# Patient Record
Sex: MALE | Race: WHITE
[De-identification: names, ages, dates, MRNs, and addresses within clinical notes are randomized per-mention and may not be internally consistent; named-entity substitution may affect disease eponyms.]

---

## 2019-02-19 ENCOUNTER — HOSPITAL ENCOUNTER (INPATIENT)
Dept: HOSPITAL 36 - ER | Age: 69
LOS: 3 days | Discharge: NURSING, REHAB OR LONG-TERM CARE | DRG: 885 | End: 2019-02-22
Attending: PSYCHIATRY & NEUROLOGY | Admitting: PSYCHIATRY & NEUROLOGY
Payer: MEDICARE

## 2019-02-19 DIAGNOSIS — J44.9: ICD-10-CM

## 2019-02-19 DIAGNOSIS — F31.2: Primary | ICD-10-CM

## 2019-02-19 DIAGNOSIS — N40.0: ICD-10-CM

## 2019-02-19 DIAGNOSIS — Z88.8: ICD-10-CM

## 2019-02-19 DIAGNOSIS — F17.210: ICD-10-CM

## 2019-02-19 DIAGNOSIS — N39.0: ICD-10-CM

## 2019-02-19 DIAGNOSIS — F29: ICD-10-CM

## 2019-02-19 DIAGNOSIS — K21.9: ICD-10-CM

## 2019-02-19 PROCEDURE — Z7610: HCPCS

## 2019-02-19 PROCEDURE — G0410 GRP PSYCH PARTIAL HOSP 45-50: HCPCS

## 2019-02-19 NOTE — ED PHYSICIAN CHART
ED Chief Complaint/HPI





- Patient Information


Date Seen:: 02/19/19


Time Seen:: 23:51


Chief Complaint:: Increased agitation


History of Present Illness:: 


68 yo male was [facility] from SN[facility] to ER for evaluation of increased agitation and 

aggressive behavior towards other resident today.


Allergies:: 


 Allergies











Allergy/AdvReac Type Severity Reaction Status Date / Time


 


clozapine [From Clozaril] Allergy   Verified 02/19/19 23:41














ED Review of Systems





- Review of Systems


General/Constitutional: No fever


Skin: No rash


Head: No headache


Eyes: No pain


ENT: No nasal drainage


Neck: No neck pain


Cardio Vascular: No chest pain


Pulmonary: No SOB


GI: No nausea, No vomiting


Musculoskeletal: Bone or joint pain


Psychiatric: No prior psych history


Neurological: No focal symptoms





ED Past Medical History





- Past Medical History


Past Medical History: Asthma/COPD, Other (Anemia, BPH)


Social History: Smoker, No Alcohol, No Drug Use


Psychiatricy History: Schizophrenia





Family Medical History





- Family Member


  ** Mother


History Unknown: Yes





ED Physical Exam





- Physical Examination


General/Constitutional: Awake, Alert


Head: Atraumatic


Eyes: PERRL


Skin: No skin lesions


ENMT: Nasal exam nl


Neck: No nuchal rigidity


Respiratory: No Wheeze/Rhonchi/Rales


Cardio Vascular: RRR, No murmur, gallop, rubs, NL S1 S2


GI: No tenderness/rebounding/guarding


Extremities: normal strength in all extremities


Neuro/Psych: No focal deficits





ED Labs/Radiology/EKG Results





- Lab Results


Results: 





 Laboratory Last Values











WBC  8.7 Th/cmm (4.8-10.8)   02/19/19  00:14    


 


RBC  4.25 Mil/cmm (3.80-5.80)   02/19/19  00:14    


 


Hgb  12.7 gm/dL (12-16)   02/19/19  00:14    


 


Hct  37.7 % (41.0-60)  L  02/19/19  00:14    


 


MCV  88.7 fl (80-99)   02/19/19  00:14    


 


MCH  30.0 pg (27.0-31.0)   02/19/19  00:14    


 


MCHC Differential  33.8 pg (28.0-36.0)   02/19/19  00:14    


 


RDW  13.1 % (11.5-20.0)   02/19/19  00:14    


 


Plt Count  361 Th/cmm (150-400)   02/19/19  00:14    


 


MPV  6.7 fl  02/19/19  00:14    


 


Neutrophils %  59.3 % (40.0-80.0)   02/19/19  00:14    


 


Lymphocytes %  27.7 % (20.0-50.0)   02/19/19  00:14    


 


Monocytes %  10.8 % (2.0-10.0)  H  02/19/19  00:14    


 


Eosinophils %  1.5 % (0.0-5.0)   02/19/19  00:14    


 


Basophils %  0.7 % (0.0-2.0)   02/19/19  00:14    


 


Sodium  137 mEq/L (136-145)   02/19/19  00:14    


 


Potassium  3.9 mEq/L (3.5-5.1)   02/19/19  00:14    


 


Chloride  101 mEq/L ()   02/19/19  00:14    


 


Carbon Dioxide  29.4 mEq/L (21.0-31.0)   02/19/19  00:14    


 


Anion Gap  10.5  (7.0-16.0)   02/19/19  00:14    


 


BUN  21 mg/dL (7-25)   02/19/19  00:14    


 


Creatinine  1.1 mg/dL (0.7-1.3)   02/19/19  00:14    


 


Est GFR ( Amer)  > 60.0 ml/min (>90)   02/19/19  00:14    


 


Est GFR (Non-Af Amer)  > 60.0 ml/min  02/19/19  00:14    


 


BUN/Creatinine Ratio  19.1   02/19/19  00:14    


 


Glucose  116 mg/dL ()  H  02/19/19  00:14    


 


Calcium  9.2 mg/dL (8.6-10.3)   02/19/19  00:14    


 


Total Bilirubin  0.4 mg/dL (0.3-1.0)   02/19/19  00:14    


 


AST  14 U/L (13-39)   02/19/19  00:14    


 


ALT  14 U/L (7-52)   02/19/19  00:14    


 


Alkaline Phosphatase  83 U/L ()   02/19/19  00:14    


 


Troponin I  < 0.01 ng/mL (0.01-0.05)  L  02/19/19  00:14    


 


B-Natriuretic Peptide  43.4 pg/mL (5.0-100.0)   02/19/19  00:14    


 


Total Protein  6.7 gm/dL (6.0-8.3)   02/19/19  00:14    


 


Albumin  4.2 gm/dL (4.2-5.5)   02/19/19  00:14    


 


Globulin  2.5 gm/dL  02/19/19  00:14    


 


Albumin/Globulin Ratio  1.7  (1.0-1.8)   02/19/19  00:14    


 


Urine Source  MIDSTREAM   02/19/19  23:59    


 


Urine Color  YELLOW   02/19/19  23:59    


 


Urine Clarity  HAZY  (CLEAR)   02/19/19  23:59    


 


Urine pH  6.0  (4.6 - 8.0)   02/19/19  23:59    


 


Ur Specific Gravity  1.020  (1.005-1.030)   02/19/19  23:59    


 


Urine Protein  NEGATIVE mg/dL (NEGATIVE)   02/19/19  23:59    


 


Urine Glucose (UA)  NEGATIVE mg/dL (NEGATIVE)   02/19/19  23:59    


 


Urine Ketones  NEGATIVE mg/dL (NEGATIVE)   02/19/19  23:59    


 


Urine Blood  MODERATE  (NEGATIVE)  H  02/19/19  23:59    


 


Urine Nitrate  NEGATIVE  (NEGATIVE)   02/19/19  23:59    


 


Urine Bilirubin  NEGATIVE  (NEGATIVE)   02/19/19  23:59    


 


Urine Urobilinogen  0.2 E.U./dL (0.2 - 1.0)   02/19/19  23:59    


 


Ur Leukocyte Esterase  MODERATE  (NEGATIVE)  H  02/19/19  23:59    


 


Urine RBC  2-5 /hpf (0-5)  H  02/19/19  23:59    


 


Urine WBC   /hpf (0-5)  H  02/19/19  23:59    


 


Ur Epithelial Cells  OCCASIONAL /lpf (FEW)   02/19/19  23:59    


 


Urine Bacteria  FEW /hpf (NONE SEEN)   02/19/19  23:59    














- Radiology Results


Results: 


CXR: no acute abnormality





ED Assessment





- Assessment


General Assessment: 


Urinary tract infection


BPH


Psychosis


Schizophrenia


Assessment/Comments:: 


CBC, CMP, Trop, BNP, UA


EKG, CXR


Rocephin 1g IM


Admit to [facility] for further evaluation and management





ED Septic Shock





- .


Is Septic Shock (SBP<90, OR Lactate>4 mmol\L) present?: No





ED Reassessment (Disposition)





- Reassessment


Reassessment Condition:: Improved





- Patient Disposition


Discharge/Transfer:: Nasrin mcginnis/in this hosp


Admitting Medical Physician:: Ashley Blanchard


Admitting Psych Physician:: Denisa Merino

## 2019-02-20 VITALS — DIASTOLIC BLOOD PRESSURE: 73 MMHG | SYSTOLIC BLOOD PRESSURE: 121 MMHG

## 2019-02-20 LAB
ALBUMIN SERPL-MCNC: 4.2 GM/DL (ref 4.2–5.5)
ALBUMIN/GLOB SERPL: 1.7 {RATIO} (ref 1–1.8)
ALP SERPL-CCNC: 83 U/L (ref 34–104)
ALT SERPL-CCNC: 14 U/L (ref 7–52)
ANION GAP SERPL CALC-SCNC: 10.5 MMOL/L (ref 7–16)
APPEARANCE UR: (no result)
AST SERPL-CCNC: 14 U/L (ref 13–39)
BACTERIA #/AREA URNS HPF: (no result) /HPF
BASOPHILS # BLD AUTO: 0.1 TH/CUMM (ref 0–0.2)
BASOPHILS NFR BLD AUTO: 0.7 % (ref 0–2)
BILIRUB SERPL-MCNC: 0.4 MG/DL (ref 0.3–1)
BILIRUB UR-MCNC: NEGATIVE MG/DL
BUN SERPL-MCNC: 21 MG/DL (ref 7–25)
CALCIUM SERPL-MCNC: 9.2 MG/DL (ref 8.6–10.3)
CHLORIDE SERPL-SCNC: 101 MEQ/L (ref 98–107)
CHOLEST SERPL-MCNC: 147 MG/DL (ref ?–200)
CO2 SERPL-SCNC: 29.4 MEQ/L (ref 21–31)
COLOR UR: YELLOW
CREAT SERPL-MCNC: 1.1 MG/DL (ref 0.7–1.3)
EOSINOPHIL # BLD AUTO: 0.1 TH/CMM (ref 0.1–0.4)
EOSINOPHIL NFR BLD AUTO: 1.5 % (ref 0–5)
EPI CELLS URNS QL MICRO: (no result) /LPF
ERYTHROCYTE [DISTWIDTH] IN BLOOD BY AUTOMATED COUNT: 13.1 % (ref 11.5–20)
GLOBULIN SER-MCNC: 2.5 GM/DL
GLUCOSE SERPL-MCNC: 116 MG/DL (ref 70–105)
GLUCOSE UR STRIP-MCNC: NEGATIVE MG/DL
HCT VFR BLD CALC: 37.7 % (ref 41–60)
HDLC SERPL-MCNC: 48 MG/DL (ref 23–92)
HGB BLD-MCNC: 12.7 GM/DL (ref 12–16)
KETONES UR STRIP-MCNC: NEGATIVE MG/DL
LEUKOCYTE ESTERASE UR-ACNC: (no result)
LYMPHOCYTE AB SER FC-ACNC: 2.4 TH/CMM (ref 1.5–3)
LYMPHOCYTES NFR BLD AUTO: 27.7 % (ref 20–50)
MCH RBC QN AUTO: 30 PG (ref 27–31)
MCHC RBC AUTO-ENTMCNC: 33.8 PG (ref 28–36)
MCV RBC AUTO: 88.7 FL (ref 80–99)
MICRO URNS: YES
MONOCYTES # BLD AUTO: 0.9 TH/CMM (ref 0.3–1)
MONOCYTES NFR BLD AUTO: 10.8 % (ref 2–10)
NEUTROPHILS # BLD: 5.2 TH/CMM (ref 1.8–8)
NEUTROPHILS NFR BLD AUTO: 59.3 % (ref 40–80)
NITRITE UR QL STRIP: NEGATIVE
PH UR STRIP: 6 [PH] (ref 4.6–8)
PLATELET # BLD: 361 TH/CMM (ref 150–400)
PMV BLD AUTO: 6.7 FL
POTASSIUM SERPL-SCNC: 3.9 MEQ/L (ref 3.5–5.1)
PROT UR STRIP-MCNC: NEGATIVE MG/DL
RBC # BLD AUTO: 4.25 MIL/CMM (ref 3.8–5.8)
RBC # UR STRIP: (no result) /UL
RBC #/AREA URNS HPF: (no result) /HPF (ref 0–5)
SODIUM SERPL-SCNC: 137 MEQ/L (ref 136–145)
SP GR UR STRIP: 1.02 (ref 1–1.03)
TRIGL SERPL-MCNC: 107 MG/DL (ref ?–150)
URINALYSIS COMPLETE PNL UR: (no result)
UROBILINOGEN UR STRIP-ACNC: 0.2 E.U./DL (ref 0.2–1)
WBC # BLD AUTO: 8.7 TH/CMM (ref 4.8–10.8)
WBC #/AREA URNS HPF: (no result) /HPF (ref 0–5)

## 2019-02-20 RX ADMIN — IPRATROPIUM BROMIDE SCH MG: 0.5 SOLUTION RESPIRATORY (INHALATION) at 23:42

## 2019-02-20 RX ADMIN — PANTOPRAZOLE SODIUM SCH MG: 40 TABLET, DELAYED RELEASE ORAL at 07:11

## 2019-02-20 RX ADMIN — IPRATROPIUM BROMIDE SCH: 0.5 SOLUTION RESPIRATORY (INHALATION) at 19:00

## 2019-02-20 NOTE — DIAGNOSTIC IMAGING REPORT
Portable chest x-ray



History: Shortness of breath



Allowing for portable technique the heart size is normal. 

Atherosclerotic calcification seen within the aortic arch.  No focal

pulmonary parenchymal processes. No hilar or mediastinal abnormalities.



Impression:

1.  No acute abnormalities

2.  Atherosclerotic vascular changes

## 2019-02-20 NOTE — HISTORY & PHYSICAL
ADMIT DATE:  



HISTORY OF PRESENT ILLNESS:  The patient is a 69-year-old male with long history

of asthma, benign prostatic hypertrophy, gastroesophageal reflux, psychosis,

admitted to [facility] under Dr. Merino's service.  The patient

denies any chest pain, shortness of breath, nausea, vomiting, fever, or chills.



PAST MEDICAL HISTORY:  Significant for benign prostatic hypertrophy, asthma,

gastroesophageal reflux, psychosis.



PAST SURGICAL HISTORY:  No recent surgery.



ALLERGIES:  CLOZAPINE.



SOCIAL HISTORY:  No smoking, no alcohol, no drug.



FAMILY HISTORY:  Noncontributory.



MEDICATIONS:  Follow admission reconciliation.



REVIEW OF SYSTEMS:

IMMUNO SYSTEM:  No history of chronic immuno disorder.

CARDIOVASCULAR SYSTEM:  No coronary artery disease.

ENDOCRINE SYSTEM:  No diabetes or thyroid problem.

GASTROINTESTINAL SYSTEM:  No upper or lower gastrointestinal bleed.

NEUROLOGICAL SYSTEM:  Seizure disorder.

MUSCULOSKELETAL SYSTEM:  No muscular dystrophy.

HEMATOLOGIC SYSTEM:  No bleeding tendencies.

RESPIRATORY:  The patient has asthma.

GENITOURINARY:  No dysuria or hematuria.  The patient has benign prostatic

hypertrophy.



PHYSICAL EXAMINATION:

GENERAL:  He is awake, alert.

VITAL SIGNS:  Temperature 97.9, heart rate 82, blood pressure 153/83.

HEENT:  Normocephalic.  Pupils are reactive to light and accommodation.  Sclerae

clear.

NECK:  Supple.  Negative for lymphadenopathy, JVD, or bruit.

CHEST:  Bilaterally normal.  No rhonchi or wheezing.

HEART:  S1, S2 normal.  No gallop rhythm.

ABDOMEN:  Soft, bowel sounds positive.

EXTREMITIES:  No edema.

NEUROLOGIC:  Awake, alert, mildly confused.  No focal motor or sensory deficit. 

Cranial nerves 2-12 are intact.



ASSESSMENT:

1.  Asthma.

2.  Chronic obstructive pulmonary disease.

3.  Gastroesophageal reflux.

4.  Psychosis.



PLAN:  The patient admitted to the hospital under Dr. Merino's service.  Medical

problem  addressed during hospitalization is psychosis.  Problem addressed at

discharge are COPD and benign prostatic hypertrophy.  The patient is medically

stable for activity.



Thank you, Dr. Merino, for asking me to see your patient.





DD: 02/20/2019 19:25

DT: 02/20/2019 19:43

JOB# [id number]  [id number]

## 2019-02-21 RX ADMIN — IPRATROPIUM BROMIDE SCH MG: 0.5 SOLUTION RESPIRATORY (INHALATION) at 19:22

## 2019-02-21 RX ADMIN — IPRATROPIUM BROMIDE SCH MG: 0.5 SOLUTION RESPIRATORY (INHALATION) at 06:57

## 2019-02-21 RX ADMIN — PANTOPRAZOLE SODIUM SCH MG: 40 TABLET, DELAYED RELEASE ORAL at 06:43

## 2019-02-21 RX ADMIN — IPRATROPIUM BROMIDE SCH: 0.5 SOLUTION RESPIRATORY (INHALATION) at 00:00

## 2019-02-21 RX ADMIN — IPRATROPIUM BROMIDE SCH MG: 0.5 SOLUTION RESPIRATORY (INHALATION) at 14:01

## 2019-02-21 NOTE — INTERNAL MEDICINE PROG NOTE
Internal Medicine Subjective





- Subjective


Service Date: 02/21/19


Patient seen and examined:: without staff


Patient is:: awake, verbal, ambulating, talking


Per staff patient has:: no adverse event





Internal Medicine Objective





- Results


Result Diagrams: 


 02/19/19 00:14





 02/19/19 00:14


Recent Labs: 


 Laboratory Last Values











WBC  8.7 Th/cmm (4.8-10.8)   02/19/19  00:14    


 


RBC  4.25 Mil/cmm (3.80-5.80)   02/19/19  00:14    


 


Hgb  12.7 gm/dL (12-16)   02/19/19  00:14    


 


Hct  37.7 % (41.0-60)  L  02/19/19  00:14    


 


MCV  88.7 fl (80-99)   02/19/19  00:14    


 


MCH  30.0 pg (27.0-31.0)   02/19/19  00:14    


 


MCHC Differential  33.8 pg (28.0-36.0)   02/19/19  00:14    


 


RDW  13.1 % (11.5-20.0)   02/19/19  00:14    


 


Plt Count  361 Th/cmm (150-400)   02/19/19  00:14    


 


MPV  6.7 fl  02/19/19  00:14    


 


Neutrophils %  59.3 % (40.0-80.0)   02/19/19  00:14    


 


Lymphocytes %  27.7 % (20.0-50.0)   02/19/19  00:14    


 


Monocytes %  10.8 % (2.0-10.0)  H  02/19/19  00:14    


 


Eosinophils %  1.5 % (0.0-5.0)   02/19/19  00:14    


 


Basophils %  0.7 % (0.0-2.0)   02/19/19  00:14    


 


Sodium  137 mEq/L (136-145)   02/19/19  00:14    


 


Potassium  3.9 mEq/L (3.5-5.1)   02/19/19  00:14    


 


Chloride  101 mEq/L ()   02/19/19  00:14    


 


Carbon Dioxide  29.4 mEq/L (21.0-31.0)   02/19/19  00:14    


 


Anion Gap  10.5  (7.0-16.0)   02/19/19  00:14    


 


BUN  21 mg/dL (7-25)   02/19/19  00:14    


 


Creatinine  1.1 mg/dL (0.7-1.3)   02/19/19  00:14    


 


Est GFR ( Amer)  > 60.0 ml/min (>90)   02/19/19  00:14    


 


Est GFR (Non-Af Amer)  > 60.0 ml/min  02/19/19  00:14    


 


BUN/Creatinine Ratio  19.1   02/19/19  00:14    


 


Glucose  116 mg/dL ()  H  02/19/19  00:14    


 


Calcium  9.2 mg/dL (8.6-10.3)   02/19/19  00:14    


 


Total Bilirubin  0.4 mg/dL (0.3-1.0)   02/19/19  00:14    


 


AST  14 U/L (13-39)   02/19/19  00:14    


 


ALT  14 U/L (7-52)   02/19/19  00:14    


 


Alkaline Phosphatase  83 U/L ()   02/19/19  00:14    


 


Troponin I  < 0.01 ng/mL (0.01-0.05)  L  02/19/19  00:14    


 


B-Natriuretic Peptide  43.4 pg/mL (5.0-100.0)   02/19/19  00:14    


 


Total Protein  6.7 gm/dL (6.0-8.3)   02/19/19  00:14    


 


Albumin  4.2 gm/dL (4.2-5.5)   02/19/19  00:14    


 


Globulin  2.5 gm/dL  02/19/19  00:14    


 


Albumin/Globulin Ratio  1.7  (1.0-1.8)   02/19/19  00:14    


 


Triglycerides  107 mg/dL (<150)   02/20/19  00:14    


 


Cholesterol  147 mg/dL (<200)   02/20/19  00:14    


 


LDL Cholesterol Direct  110 mg/dL ()   02/20/19  00:14    


 


HDL Cholesterol  48 mg/dL (23-92)   02/20/19  00:14    


 


TSH  1.88 uIU/ml (0.34-5.60)   02/19/19  00:14    


 


Urine Source  MIDSTREAM   02/19/19  23:59    


 


Urine Color  YELLOW   02/19/19  23:59    


 


Urine Clarity  HAZY  (CLEAR)   02/19/19  23:59    


 


Urine pH  6.0  (4.6 - 8.0)   02/19/19  23:59    


 


Ur Specific Gravity  1.020  (1.005-1.030)   02/19/19  23:59    


 


Urine Protein  NEGATIVE mg/dL (NEGATIVE)   02/19/19  23:59    


 


Urine Glucose (UA)  NEGATIVE mg/dL (NEGATIVE)   02/19/19  23:59    


 


Urine Ketones  NEGATIVE mg/dL (NEGATIVE)   02/19/19  23:59    


 


Urine Blood  MODERATE  (NEGATIVE)  H  02/19/19  23:59    


 


Urine Nitrate  NEGATIVE  (NEGATIVE)   02/19/19  23:59    


 


Urine Bilirubin  NEGATIVE  (NEGATIVE)   02/19/19  23:59    


 


Urine Urobilinogen  0.2 E.U./dL (0.2 - 1.0)   02/19/19  23:59    


 


Ur Leukocyte Esterase  MODERATE  (NEGATIVE)  H  02/19/19  23:59    


 


Urine RBC  2-5 /hpf (0-5)  H  02/19/19  23:59    


 


Urine WBC   /hpf (0-5)  H  02/19/19  23:59    


 


Ur Epithelial Cells  OCCASIONAL /lpf (FEW)   02/19/19  23:59    


 


Urine Bacteria  FEW /hpf (NONE SEEN)   02/19/19  23:59    














- Physical Exam


Vitals and I&O: 


 Vital Signs











Temp  97.4 F   02/21/19 16:16


 


Pulse  75   02/21/19 16:16


 


Resp  20   02/21/19 16:16


 


BP  115/67   02/21/19 16:16


 


Pulse Ox  100   02/21/19 16:16








 Intake & Output











 02/20/19 02/21/19 02/21/19





 18:59 06:59 18:59


 


Intake Total 1500 120 


 


Balance 1500 120 


 


Intake:   


 


  Oral 1500 120 


 


Other:   


 


  # Voids 3 3 


 


  # Bowel Movements 0 0 











Active Medications: 


Current Medications





Acetaminophen (Tylenol)  650 mg PO Q4H PRN


   PRN Reason: Pain or Fever >101


   Stop: 04/21/19 04:59


Al Hydrox/Mg Hydrox/Simethicone (Maalox)  30 ml PO Q4H PRN


   PRN Reason: GI DISTRESS


   Stop: 04/21/19 03:42


   Last Admin: 02/20/19 18:03 Dose:  30 ml


Benztropine Mesylate (Cogentin)  2 mg PO BID [facility]


   Stop: 04/21/19 08:59


   Last Admin: 02/20/19 17:13 Dose:  2 mg


Docusate Sodium (Colace)  250 mg PO BID [facility]UK[facility]


   Stop: 04/21/19 08:59


   Last Admin: 02/20/19 17:13 Dose:  250 mg


Folic Acid (Folate)  1 mg PO DAILY [facility]UK[facility]


   Stop: 04/21/19 08:59


   Last Admin: 02/20/19 08:43 Dose:  1 mg


Ipratropium Bromide (Atrovent Neb 0.5mg/2.5ml)  0.5 mg HHN Q6HRT MUKESH


   Stop: 04/21/19 12:59


   Last Admin: 02/21/19 14:01 Dose:  0.5 mg


Lorazepam (Ativan)  0.5 mg PO Q4H PRN; Protocol


   PRN Reason: Agitation


   Stop: 04/21/19 03:42


Magnesium Hydroxide (Milk Of Magnesia)  30 ml PO HS PRN


   PRN Reason: Constipation


Miscellaneous (Folic Acid [Folic Acid])  400 mcg PO DAILY MUKESH


   Stop: 04/23/19 08:59


Multivitamins/Vitamin C (Theragran)  1 tab PO DAILY [facility]


   Stop: 04/21/19 08:59


   Last Admin: 02/20/19 08:43 Dose:  1 tab


Olanzapine (Zyprexa)  10 mg PO HS MUKESH; Protocol


   Stop: 04/21/19 20:59


   Last Admin: 02/20/19 20:38 Dose:  10 mg


Pantoprazole Sodium (Protonix)  40 mg PO QDAC MUKESH


   Stop: 04/21/19 07:29


   Last Admin: 02/21/19 06:43 Dose:  40 mg


Tamsulosin HCl (Flomax)  0.4 mg PO DAILY MUKESH


   Stop: 04/21/19 08:59


   Last Admin: 02/20/19 08:43 Dose:  0.4 mg


Zolpidem Tartrate (Ambien)  5 mg PO HS PRN


   PRN Reason: Insomnia


   Last Admin: 02/20/19 20:39 Dose:  5 mg








General: alert


HEENT: NC/AT, PERRLA, EOMI, anicteric sclerae, throat clear


Neck: Supple, No JVD, No thyromegaly, +2 carotid pulse wo bruit, No LAD


Lungs: CTAB


Cardiovascular: RRR, Normal S1, Normal S2


Abdomen: soft, non-tender, non-distended


Extremities: clear


Neurological: no change, alert, gait stable





Internal Medicine Assmt/Plan





- Assessment


Assessment: 





1.UTI


2.BPH.


3.PILLO.


4.PSYCHOSIS.





- Plan


Plan: 





CIPRO 500 MG PO BID FOR 5 DAYS.

## 2019-02-21 NOTE — PSYCHIATRIC EVALUATION
DATE OF SERVICE:  



PSYCHIATRIC INITIAL EVALUATION AND MENTAL STATUS EXAM



PATIENT'S AGE:  69.



SEX:  Male.



PHYSICIAN:  Dr. Merino.



CHIEF COMPLAINT:  Agitation and irritability.



HISTORY OF PRESENT ILLNESS:  The patient was transferred from [facility] to [facility] because of increased irritability,

agitation, and aggressive behavior.  The patient also has been intrusive to

others and has been in angry mood.  The patient also has been having difficulty

following directions.  The patient has been taking Zyprexa in a dose of 10 mg at

bedtime with no improvement.



PAST PSYCHIATRIC HISTORY:  The patient has history of what seems to be bipolar

disorder.



PAST MEDICAL HISTORY:  The patient has hypertension.



SOCIAL HISTORY:  The patient lives in [facility].  No

known alcohol or drug use.



ALLERGIES:  No known allergies.



MENTAL STATUS EXAMINATION:  The patient appears slightly older than stated age. 

Irritable mood.  Anxious.  Disheveled.  Thought processes are circumstantial and

tangential.  The patient denies hallucinations, but seems to be preoccupied and

paranoid.  He denies any thoughts of suicide or homicide.  The patient is alert

and oriented to time, place, person, and situation.  Intact immediate, recent

and remote memories.  Poor insight and poor judgment.



ASSESSMENT:

PRIMARY DIAGNOSIS:  Bipolar disorder, manic episode, with psychotic features.



TREATMENT PLAN:  We will monitor the patient's behavior and condition closely. 

Also, we will adjust psychotropic medications.  Also working on his behaviour

and behavior modification.



ESTIMATED LENGTH OF STAY:  5-7 days.



THE PATIENT'S STRENGTHS AND WEAKNESSES:  The patient's strength is not clear at

this time.  Weaknesses is his poor judgment.



AFTER DISCHARGE PLAN FOR DISCHARGE:  Better impulse control and stabilizing

psychotropic medications.





DD: 02/20/2019 20:05

DT: 02/21/2019 01:22

[facility]# [id number]  [id number]

## 2019-02-22 RX ADMIN — IPRATROPIUM BROMIDE SCH: 0.5 SOLUTION RESPIRATORY (INHALATION) at 00:00

## 2019-02-22 RX ADMIN — IPRATROPIUM BROMIDE SCH MG: 0.5 SOLUTION RESPIRATORY (INHALATION) at 07:01

## 2019-02-22 RX ADMIN — PANTOPRAZOLE SODIUM SCH MG: 40 TABLET, DELAYED RELEASE ORAL at 06:35

## 2019-02-22 RX ADMIN — IPRATROPIUM BROMIDE SCH MG: 0.5 SOLUTION RESPIRATORY (INHALATION) at 13:12

## 2019-02-25 NOTE — DISCHARGE SUMMARY
DATE OF DISCHARGE:  02/22/2019



FINAL DIAGNOSES/PRIMARY DIAGNOSES:  Depressive mood disorder, severe, with

psychotic features.



SECONDARY DIAGNOSES:  Rule out dementia with psychosis, moderate to severe.



REASON FOR HOSPITALIZATION:  The patient was admitted to the hospital because of

increased agitation and irritability as well as depression.



HOSPITAL COURSE:  The patient continued to be easily agitated.  The patient also

needed a lot of redirections.  Also, at times seems to be depressed and he was

interacting minimally with others.  He also was having difficulty with

redirections and his judgment was poor.  The patient was given Zyprexa 10 mg

every day as well as Ativan on a p.r.n. basis.  Gradually, the patient's affect

was brighter and he was less agitated and less irritable and he was discharged

from the hospital.



PHYSICAL EXAMINATION:  The patient had bronchial asthma as well as benign

prostatic hypertrophy.  The patient had no major medical problems while in the

hospital.



AFTER DISCHARGE PLANS:  The patient discharged from the hospital back to [place] with plan to follow him up there.



EXPECTED OUTCOME AFTER DISCHARGE:  Fair if the patient continue to take his

psychotropic medications and follow up with discharge plans.





DD: 02/25/2019 07:36

DT: 02/25/2019 10:07

[facility]# [id number]  [id number]

## 2019-07-13 ENCOUNTER — HOSPITAL ENCOUNTER (INPATIENT)
Dept: HOSPITAL 36 - ER | Age: 69
LOS: 16 days | Discharge: NURSING, REHAB OR LONG-TERM CARE | DRG: 885 | End: 2019-07-29
Attending: PSYCHIATRY & NEUROLOGY | Admitting: PSYCHIATRY & NEUROLOGY
Payer: MEDICARE

## 2019-07-13 VITALS — SYSTOLIC BLOOD PRESSURE: 115 MMHG | DIASTOLIC BLOOD PRESSURE: 62 MMHG

## 2019-07-13 DIAGNOSIS — Z79.899: ICD-10-CM

## 2019-07-13 DIAGNOSIS — Z87.11: ICD-10-CM

## 2019-07-13 DIAGNOSIS — F25.0: Primary | ICD-10-CM

## 2019-07-13 DIAGNOSIS — J44.9: ICD-10-CM

## 2019-07-13 DIAGNOSIS — M19.90: ICD-10-CM

## 2019-07-13 DIAGNOSIS — Z88.8: ICD-10-CM

## 2019-07-13 DIAGNOSIS — G40.909: ICD-10-CM

## 2019-07-13 DIAGNOSIS — K21.9: ICD-10-CM

## 2019-07-13 DIAGNOSIS — N40.0: ICD-10-CM

## 2019-07-13 DIAGNOSIS — F03.91: ICD-10-CM

## 2019-07-13 DIAGNOSIS — K59.09: ICD-10-CM

## 2019-07-13 LAB
ALBUMIN SERPL-MCNC: 3.8 GM/DL (ref 4.2–5.5)
ALBUMIN/GLOB SERPL: 1.5 {RATIO} (ref 1–1.8)
ALP SERPL-CCNC: 90 U/L (ref 34–104)
ALT SERPL-CCNC: 20 U/L (ref 7–52)
ANION GAP SERPL CALC-SCNC: 11.3 MMOL/L (ref 7–16)
APPEARANCE UR: CLEAR
AST SERPL-CCNC: 15 U/L (ref 13–39)
BACTERIA #/AREA URNS HPF: (no result) /HPF
BASOPHILS # BLD AUTO: 0.1 TH/CUMM (ref 0–0.2)
BASOPHILS NFR BLD AUTO: 0.8 % (ref 0–2)
BILIRUB SERPL-MCNC: 0.4 MG/DL (ref 0.3–1)
BILIRUB UR-MCNC: NEGATIVE MG/DL
BUN SERPL-MCNC: 25 MG/DL (ref 7–25)
CALCIUM SERPL-MCNC: 9.3 MG/DL (ref 8.6–10.3)
CHLORIDE SERPL-SCNC: 101 MEQ/L (ref 98–107)
CHOLEST SERPL-MCNC: 156 MG/DL (ref ?–200)
CO2 SERPL-SCNC: 25.8 MEQ/L (ref 21–31)
COLOR UR: YELLOW
CREAT SERPL-MCNC: 1 MG/DL (ref 0.7–1.3)
EOSINOPHIL # BLD AUTO: 0.3 TH/CMM (ref 0.1–0.4)
EOSINOPHIL NFR BLD AUTO: 4 % (ref 0–5)
EPI CELLS URNS QL MICRO: (no result) /LPF
ERYTHROCYTE [DISTWIDTH] IN BLOOD BY AUTOMATED COUNT: 14.5 % (ref 11.5–20)
GLOBULIN SER-MCNC: 2.6 GM/DL
GLUCOSE SERPL-MCNC: 99 MG/DL (ref 70–105)
GLUCOSE UR STRIP-MCNC: NEGATIVE MG/DL
HCT VFR BLD CALC: 36.3 % (ref 41–60)
HDLC SERPL-MCNC: 48 MG/DL (ref 23–92)
HGB BLD-MCNC: 12.2 GM/DL (ref 12–16)
KETONES UR STRIP-MCNC: NEGATIVE MG/DL
LEUKOCYTE ESTERASE UR-ACNC: NEGATIVE
LYMPHOCYTE AB SER FC-ACNC: 2.8 TH/CMM (ref 1.5–3)
LYMPHOCYTES NFR BLD AUTO: 38.3 % (ref 20–50)
MCH RBC QN AUTO: 29.3 PG (ref 27–31)
MCHC RBC AUTO-ENTMCNC: 33.7 PG (ref 28–36)
MCV RBC AUTO: 87 FL (ref 80–99)
MICRO URNS: YES
MONOCYTES # BLD AUTO: 0.8 TH/CMM (ref 0.3–1)
MONOCYTES NFR BLD AUTO: 11.1 % (ref 2–10)
NEUTROPHILS # BLD: 3.4 TH/CMM (ref 1.8–8)
NEUTROPHILS NFR BLD AUTO: 45.8 % (ref 40–80)
NITRITE UR QL STRIP: NEGATIVE
PH UR STRIP: 5.5 [PH] (ref 4.6–8)
PLATELET # BLD: 284 TH/CMM (ref 150–400)
POTASSIUM SERPL-SCNC: 4.1 MEQ/L (ref 3.5–5.1)
PROT UR STRIP-MCNC: NEGATIVE MG/DL
RBC # BLD AUTO: 4.18 MIL/CMM (ref 3.8–5.8)
RBC # UR STRIP: (no result) /UL
RBC #/AREA URNS HPF: (no result) /HPF (ref 0–5)
SODIUM SERPL-SCNC: 134 MEQ/L (ref 136–145)
SP GR UR STRIP: 1.02 (ref 1–1.03)
TRIGL SERPL-MCNC: 73 MG/DL (ref ?–150)
URINALYSIS COMPLETE PNL UR: (no result)
UROBILINOGEN UR STRIP-ACNC: 0.2 E.U./DL (ref 0.2–1)
WBC # BLD AUTO: 7.4 TH/CMM (ref 4.8–10.8)
WBC #/AREA URNS HPF: (no result) /HPF (ref 0–5)

## 2019-07-13 PROCEDURE — G0410 GRP PSYCH PARTIAL HOSP 45-50: HCPCS

## 2019-07-13 PROCEDURE — Z7610: HCPCS

## 2019-07-13 RX ADMIN — PANTOPRAZOLE SODIUM SCH MG: 40 TABLET, DELAYED RELEASE ORAL at 09:40

## 2019-07-13 NOTE — HISTORY & PHYSICAL
ADMIT DATE:  



HISTORY OF PRESENT ILLNESS:  The patient is a 69-year-old male with long history

of dementia, psychosis, benign prostatic hypertrophy, degenerative joint

disease, admitted to [facility] under Dr. Merino's service for

evaluation and treatment.  Apparently, the patient has been very confused and

psychotic.  No fever, no chills, no nausea, no vomiting, no chest pain.



PAST MEDICAL HISTORY:  Significant for benign prostatic hypertrophy, chronic

constipation, degenerative joint disease, psychosis, dementia.



PAST SURGICAL HISTORY:  No recent surgery.



ALLERGIES:  CLONAZEPAM.



SOCIAL HISTORY:  No smoking, no alcohol, no drug.



FAMILY HISTORY:  Noncontributory.



MEDICATIONS:  Follow admission reconciliation.



REVIEW OF SYSTEMS:

RENAL SYSTEM:  No history of chronic renal disorder.

CARDIOVASCULAR SYSTEM:  No coronary artery disease.

ENDOCRINE SYSTEM:  No diabetes or thyroid problem.

GASTROINTESTINAL SYSTEM:  No upper or lower GI bleeding.

NEUROLOGICAL SYSTEM:  No seizure disorder.

MUSCULOSKELETAL SYSTEM:  No muscular dystrophy.  Has degenerative joint disease.

HEMATOLOGIC SYSTEM:  No bleeding tendencies.

RESPIRATORY SYSTEM:  No asthma.

GENITOURINARY:  Has benign prostatic hypertrophy.



PHYSICAL EXAMINATION:

GENERAL:  He is awake, not coherent.

VITAL SIGNS:  Temperature 97.7, heart rate 70, blood pressure 118/69.

HEENT:  Normocephalic.  Pupils reacting equal to light and accommodation. 

Sclerae clear.

NECK:  Supple.  Negative for lymphadenopathy, JVD or bruit.

CHEST:  Bilaterally normal.  No rhonchi or wheezing.

HEART:  S1, S2 normal.  No gallop rhythm.

ABDOMEN:  Soft, bowel sounds positive.

EXTREMITIES:  No edema.

NEUROLOGIC:  He is awake, alert, not fully coherent.  No focal muscle deficits. 

Cranial nerves 2-12 is intact.



LABORATORY DATA:  White blood cell 7.4, hemoglobin 12.2, hematocrit 36.3,

platelet is 284.  Sodium 134, potassium 4.1, BUN 25, creatinine 1.0.



ASSESSMENT:

1.  Benign prostatic hypertrophy.

2.  Degenerative joint disease.

3.  Chronic constipation.

4.  Psychosis.



PLAN:  The patient admitted to hospital under Dr. Merino's service.  Medical

problem addressed during hospitalization is psychosis.  Medical problems

addressed at discharge are history of benign prostatic hypertrophy, chronic

constipation, degenerative joint disease.  The patient is medically stable for

activity.



Thank you Dr. Merino for asking me to see your patient.  The patient is a full

code.





DD: 07/13/2019 20:38

DT: 07/13/2019 22:00

JOB# [id number]  [id number]

## 2019-07-13 NOTE — PSYCHIATRIC EVALUATION
DATE OF SERVICE:  07/13/2019



PSYCHIATRIC INITIAL EVALUATION, MENTAL STATUS EXAM



AGE: 69



SEX:  Male.



PHYSICIAN:  Dr. Merino.



CHIEF COMPLAINT:  Severe agitation and the inability to follow directions.



HISTORY OF PRESENT ILLNESS:  The patient is a 69-year-old male who exhibited a

change in his behavior.  The patient has been having episodes of continuous

pacing and wandering in the hallways in [facility] where he lives. 

Also, has been entering other patient's rooms and causing disturbances to other

residents.  The patient also has been resisting care with difficulty following

directions.  The patient also has been delusional and has been paranoid and has

been having irrational thoughts and thinking that the people are there to get

him and hurt him.  The patient also has been forgetful and has been causing a

lot of disturbances to other residents in the place.



PAST PSYCHIATRIC HISTORY:  The patient has history of multiple psychiatric

hospitalizations for treatment of psychosis and dementia.



PAST MEDICAL HISTORY:  The patient has no major medical problems except

gastroesophageal reflux disease, COPD and asthma.



SOCIAL HISTORY:  The patient lives in [facility].  No

known alcohol or street drug use.



ALLERGIES:  No known allergies.



MENTAL STATUS EXAMINATION:  The patient appears slightly older than his stated

age.  Disheveled.  Anxious.  Restless.  Thought processes are circumstantial

with occasional flight of ideas.  The patient denies any auditory or visual

hallucinations, but preoccupied and actively responding.  The patient denies

suicidal or homicidal ideations.  The patient is alert and oriented to

situation, but not to the place or person.  Impaired immediate and recent

memory, but intact remote memories.  Poor insight and poor judgment.  Seems to

be of low average intelligence based on his verbal ability.



ASSESSMENT:

PRIMARY DIAGNOSIS:  Schizoaffective disorder, bipolar type, with behavior

disturbances and psychosis, severe.



SECONDARY DIAGNOSIS: Dementia, moderate to severe.



TREATMENT PLAN:  Continue to monitor his behavior and his condition closely. 

Also, continue adjusting psychotropic medications and working on behavioral

modification.



MEDICATIONS:  We will add Seroquel in a dose of 12.5 mg twice a day and will

adjust the dose.



AFTER DISCHARGE PLAN:  Outpatient treatment and followup, will continue as an

outpatient.  Also, the patient will return to [place].





DD: 07/13/2019 11:52

DT: 07/13/2019 23:02

JOB# [id number]  [id number]

## 2019-07-13 NOTE — ED PHYSICIAN CHART
ED Chief Complaint/HPI





- Patient Information


Date Seen:: 07/13/19


Time Seen:: 01:11


Chief Complaint:: disturbance of other pt


History of Present Illness:: 





69 yr old male with schizophrenia asthma bipolar epilepsy gerd anorexia who is 

reported to be wandering around disturbing other clients and disturbance of his 

care


Allergies:: 


 Allergies











Allergy/AdvReac Type Severity Reaction Status Date / Time


 


clozapine [From Clozaril] Allergy   Verified 02/19/19 23:41











Vitals:: 


 Vital Signs - 8 hr











  07/13/19





  00:40


 


Temp 97.7 F


 


HR 76


 


RR 18


 


/89


 


O2 Sat % 94














ED Review of Systems





- Review of Systems


General/Constitutional: No fever, No chills, No weight loss, No weakness, No 

diaphoresis, No edema, No loss of appetite


Skin: No skin lesions, No rash, No bruising


Head: No headache, No light-headedness


Eyes: No loss of vision, No pain, No diplopia


ENT: No earache, No nasal drainage, No sore throat, No tinnitus


Neck: No neck pain, No swelling, No thyromegaly, No stiffness, No mass noted


Cardio Vascular: No chest pain, No palpitations, No PND, No orthopnea, No edema


Pulmonary: No SOB, No cough, No sputum, No wheezing


GI: No nausea, No vomiting, No diarrhea, No pain, No melena, No hematochezia, 

No constipation, No hematemesis


G/U: No dysuria, No frequency, No hematuria


Musculoskeletal: No bone or joint pain, No back pain, No muscle pain


Endocrine: No polyuria, No polydipsia


Psychiatric: No prior psych history, No depression, No anxiety, No suicidal 

ideation


Hematopoietic: No bruising, No lymphadenopathy


Allergic/Immuno: No urticaria, No angioedema


Neurological: No syncope, No focal symptoms, No weakness, No paresthesia, No 

headache, No seizure, No dizziness, No confusion, No vertigo





ED Past Medical History





- Past Medical History


Past Medical History: Asthma/COPD, PUD/GERD, Other (uti anorexia bphepilepsy 

schizo bipolar)


Surgical History: other (lt ankle)





Family Medical History





- Family Member


  ** Mother


History Unknown: Yes





ED Physical Exam





- Physical Examination


General/Constitutional: Awake, Well-developed, well-nourished, Alert, No 

distress, GCS 15, Non-toxic appearing, Ambulatory


Head: Atraumatic


Eyes: Lids, conjuctiva normal, PERRL, EOMI


Skin: Nl inspection, No rash, No skin lesions, No ecchymosis, Well hydrated, No 

lymphadenopathy


ENMT: External ears, nose nl, Nasal exam nl, Lips, teeth, gums nl


Neck: Nontender, Full ROM w/o pain, No JVD, No nuchal rigidity, No bruit, No 

mass, No stridor


Respiratory: Nl effort/Exclusion, Clear to Auscultation, No Wheeze/Rhonchi/Rales


Cardio Vascular: RRR, No murmur, gallop, rubs, NL S1 S2


GI: No tenderness/rebounding/guarding, No organomegaly, No hernia, Normal BS's, 

Nondistended, No mass/bruits, No McBurney tenderness


: No CVA tenderness


Extremities: No tenderness or effusion, Full ROM, normal strength in all 

extremities, No edema, Normal digits & nails


Neuro/Psych: Alert/oriented, DTR's symmetric, Normal sensory exam, Normal motor 

strength, Judgement/insight normal, Mood normal, Normal gait, No focal deficits


Misc: Normal back, No paraspinal tenderness





ED Assessment





- Assessment


General Assessment: 





bipolar schizo nicotine fit agitation





ED Septic Shock





- .


Is Septic Shock (SBP<90, OR Lactate>4 mmol\L) present?: No





- <6hrs of presentation:


Vital Signs: 


 Vital Signs - 8 hr











  07/13/19





  00:40


 


Temp 97.7 F


 


HR 76


 


RR 18


 


/89


 


O2 Sat % 94














ED Reassessment (Disposition)





- Reassessment


Reassessment:: 





as previouslr


Reassessment Condition:: Unchanged





- Diagnosis


Diagnosis:: 





as previously





- Patient Disposition


Discharge/Transfer:: Acute Care w/in this hosp


Admitted to:: [facility]


Condition at Disposition:: Stable

## 2019-07-14 LAB — HBA1C BLD-MCNC: 5.8 % (ref 4.8–5.6)

## 2019-07-14 RX ADMIN — ALUMINUM HYDROXIDE, MAGNESIUM HYDROXIDE, AND SIMETHICONE PRN ML: 200; 200; 20 SUSPENSION ORAL at 22:06

## 2019-07-14 RX ADMIN — PANTOPRAZOLE SODIUM SCH MG: 40 TABLET, DELAYED RELEASE ORAL at 09:29

## 2019-07-14 NOTE — PROGRESS NOTES
DATE:  07/14/2019



SUBJECTIVE:  Chart was reviewed and the patient interviewed.  Also discussed the

patient's condition with the staff and reviewed records and labs.  The patient

is still agitated.  The patient also is still in angry and in irritable mood. 

The patient also is still delusional and paranoid and is still showing poor

ADLs.  On the other hand, the patient continued to comply with taking his

medications and the patient started on Seroquel 12.5 mg every day and Cogentin 2

mg twice a day with no side effects.



ASSESSMENT:  The patient is still agitated and psychotic.



TREATMENT PLAN:  Continue to monitor his behavior and his condition closely. 

Also, continue adjusting psychotropic medications and continue to follow up. 

Also, working on behavioral modification.





DD: 07/14/2019 07:56

DT: 07/14/2019 20:13

JOB# [id number]  [id number]

## 2019-07-14 NOTE — INTERNAL MEDICINE PROG NOTE
Internal Medicine Subjective





- Subjective


Service Date: 19


Patient seen and examined:: with staff


Patient is:: awake, verbal, in bed, confused


Per staff patient has:: no adverse event





Internal Medicine Objective





- Results


Result Diagrams: 


 19 01:07





 19 01:07


Recent Labs: 


 Laboratory Last Values











WBC  7.4 Th/cmm (4.8-10.8)   19  01:07    


 


RBC  4.18 Mil/cmm (3.80-5.80)   19  01:07    


 


Hgb  12.2 gm/dL (12-16)   19  01:07    


 


Hct  36.3 % (41.0-60)  L  19  01:07    


 


MCV  87.0 fl (80-99)   19  01:07    


 


MCH  29.3 pg (27.0-31.0)   19  01:07    


 


MCHC Differential  33.7 pg (28.0-36.0)   19  01:07    


 


RDW  14.5 % (11.5-20.0)   19  01:07    


 


Plt Count  284 Th/cmm (150-400)   19  01:07    


 


MPV  7.2 fl  19  01:07    


 


Neutrophils %  45.8 % (40.0-80.0)   19  01:07    


 


Lymphocytes %  38.3 % (20.0-50.0)   19  01:07    


 


Monocytes %  11.1 % (2.0-10.0)  H  19  01:07    


 


Eosinophils %  4.0 % (0.0-5.0)   19  01:07    


 


Basophils %  0.8 % (0.0-2.0)   19  01:07    


 


Sodium  134 mEq/L (136-145)  L  19  01:07    


 


Potassium  4.1 mEq/L (3.5-5.1)   19  01:07    


 


Chloride  101 mEq/L ()   19  01:07    


 


Carbon Dioxide  25.8 mEq/L (21.0-31.0)   19  01:07    


 


Anion Gap  11.3  (7.0-16.0)   19  01:07    


 


BUN  25 mg/dL (7-25)   19  01:07    


 


Creatinine  1.0 mg/dL (0.7-1.3)   19  01:07    


 


Est GFR ( Amer)  > 60.0 ml/min (>90)   19  01:07    


 


Est GFR (Non-Af Amer)  > 60.0 ml/min  19  01:07    


 


BUN/Creatinine Ratio  25.0   19  01:07    


 


Glucose  99 mg/dL ()   19  01:07    


 


Calcium  9.3 mg/dL (8.6-10.3)   19  01:07    


 


Total Bilirubin  0.4 mg/dL (0.3-1.0)   19  01:07    


 


AST  15 U/L (13-39)   19  01:07    


 


ALT  20 U/L (7-52)   19  01:07    


 


Alkaline Phosphatase  90 U/L ()   19  01:07    


 


Total Protein  6.4 gm/dL (6.0-8.3)   19  01:07    


 


Albumin  3.8 gm/dL (4.2-5.5)  L  19  01:07    


 


Globulin  2.6 gm/dL  19  01:07    


 


Albumin/Globulin Ratio  1.5  (1.0-1.8)   19  01:07    


 


Triglycerides  73 mg/dL (<150)   19  01:07    


 


Cholesterol  156 mg/dL (<200)   19  01:07    


 


LDL Cholesterol Direct  101 mg/dL ()   19  01:07    


 


HDL Cholesterol  48 mg/dL (23-92)   19  01:07    


 


TSH  2.65 uIU/ml (0.34-5.60)   19  01:07    


 


Urine Source  CLEAN C   19  02:21    


 


Urine Color  YELLOW   19  02:21    


 


Urine Clarity  CLEAR  (CLEAR)   19  02:21    


 


Urine pH  5.5  (4.6 - 8.0)   19  02:21    


 


Ur Specific Gravity  1.020  (1.005-1.030)   19  02:21    


 


Urine Protein  NEGATIVE mg/dL (NEGATIVE)   19  02:21    


 


Urine Glucose (UA)  NEGATIVE mg/dL (NEGATIVE)   19  02:21    


 


Urine Ketones  NEGATIVE mg/dL (NEGATIVE)   19  02:21    


 


Urine Blood  TRACE  (NEGATIVE)   19  02:21    


 


Urine Nitrate  NEGATIVE  (NEGATIVE)   19  02:21    


 


Urine Bilirubin  NEGATIVE  (NEGATIVE)   19  02:21    


 


Urine Urobilinogen  0.2 E.U./dL (0.2 - 1.0)   19  02:21    


 


Ur Leukocyte Esterase  NEGATIVE  (NEGATIVE)   19  02:21    


 


Urine RBC  2-5 /hpf (0-5)  H  19  02:21    


 


Urine WBC  0-2 /hpf (0-5)   19  02:21    


 


Ur Epithelial Cells  NONE SEEN /lpf (FEW)   19  02:21    


 


Urine Bacteria  FEW /hpf (NONE SEEN)   19  02:21    


 


RPR  NONREACTIVE  (NONREACTIVE)   19  01:07    














- Physical Exam


Vitals and I&O: 


 Vital Signs











Temp  98.8 F   19 20:45


 


Pulse  69   19 20:45


 


Resp  18   19 20:45


 


BP  118/64   19 20:45


 


Pulse Ox  93   19 20:45








 Intake & Output











 07/14/19 07/14/19 07/15/19





 06:59 18:59 06:59


 


Intake Total 240 1400 240


 


Balance 240 1400 240


 


Intake:   


 


  Oral 240 1400 240


 


Other:   


 


  # Voids 2 4 2


 


  # Bowel Movements  0 











Active Medications: 


Current Medications





Acetaminophen (Tylenol)  650 mg PO Q4HR PRN


   PRN Reason: Mild Pain / Temp above 100


   Stop: 19 02:31


Acetaminophen (Tylenol)  650 mg PO Q4HR PRN


   PRN Reason: Pain or Fever >101


   Stop: 19 20:28


Al Hydrox/Mg Hydrox/Simethicone (Maalox)  30 ml PO Q4HR PRN


   PRN Reason: GI DISTRESS


   Stop: 19 02:31


Benztropine Mesylate (Cogentin)  2 mg PO BID MUKESH


   Stop: 19 08:59


   Last Admin: 19 16:24 Dose:  2 mg


Docusate Sodium (Colace)  250 mg PO DAILY MUKESH


   Stop: 19 08:59


   Last Admin: 19 09:28 Dose:  250 mg


Folic Acid (Folate)  0.5 mg PO DAILY [facility]


   Stop: 19 08:59


   Last Admin: 19 09:28 Dose:  0.5 mg


Haloperidol Decanoate (Haldol Dec)  100 mg IM QMONTH MUKESH


   Stop: 10/04/19 08:59


Ipratropium Bromide (Atrovent Neb 0.5mg/2.5ml)  0.5 mg HHN Q6HRT MUKESH


   Stop: 19 12:59


Lorazepam (Ativan)  0.5 mg PO Q4HR PRN; Protocol


   PRN Reason: Agitation


   Stop: 19 02:31


Magnesium Hydroxide (Milk Of Magnesia)  30 ml PO HS PRN


   PRN Reason: Constipation


Pantoprazole Sodium (Protonix)  40 mg PO DAILY [facility]


   Stop: 19 06:29


   Last Admin: 19 09:29 Dose:  40 mg


Quetiapine Fumarate (Seroquel)  12.5 mg PO DAILY [facility]; Protocol


   Stop: 19 08:59


   Last Admin: 19 09:29 Dose:  12.5 mg


Tamsulosin HCl (Flomax)  0.4 mg PO DAILY [facility]


   Stop: 19 08:59


   Last Admin: 19 09:28 Dose:  0.4 mg


Zolpidem Tartrate (Ambien)  5 mg PO HSMR1 PRN


   PRN Reason: Insomnia


   Stop: 19 02:31








General: demented


HEENT: NC/AT, PERRLA, EOMI, anicteric sclerae, throat clear


Neck: Supple, No JVD, No thyromegaly, +2 carotid pulse wo bruit, No LAD


Lungs: CTAB


Cardiovascular: RRR, Normal S1, Normal S2, without murmur


Abdomen: soft, non-tender, non-distended


Extremities: clear


Neurological: no change





Internal Medicine Assmt/Plan





- Assessment


Assessment: 





1.BPH


2.DJD.


3.CHRONIC CONSTIPATION.


4.PSYCHOSIS





- Plan


Plan: 





CONTINUE ON CURRENT5 MEDICATION AND DIET.





Nutritional Asmnt/Malnutr-PDOC





- Dietary Evaluation


Malnutrition Findings (Please click <Entered> for more info): 








Nutritional Asmnt/Malnutrition                             Start:  19 08:

31


Text:                                                      Status: Complete    

  


Freq:                                                                          

  


Protocol:                                                                      

  


 Document     19 08:33  TAMARDEBBY  (Rec: 19 08:47  TAMARDEBBY SALOMON-

FNS1)


 Nutritional Asmnt/Malnutrition


     Patient General Information


      Nutritional Screening                      High Risk


                                                 Consult


      Diagnosis                                  Schizophrenia, bipolar


      Pertinent Medical Hx/Surgical Hx           SCHIZOPHRENIA BIPOLAR UTI BPH


                                                 EPILEPSY ANEMIA ANOREXIA


      Subjective Information                     Patient [facility] from [facility].


                                                 Missing broken teeth. Patient


                                                 in activity room at time of


                                                 visit. Tolerating current diet


                                                 order.


      Current Diet Order/ Nutrition Support      Mechanical soft, chopped, no


                                                 added sodium, high protein


      Patient / S.O                              Not Indicated


      Pertinent Medications                      maalox, colace, folate, MOM,


                                                 Protonix


      Pertinent Labs                             () Na 134, Albumin 3.8


     Nutritional Hx/Data


      Height                                     1.7 m


      Height (Calculated Centimeters)            170.2


      Current Weight (lbs)                       59.421 kg


      Weight (Calculated Kilograms)              59.4


      Weight (Calculated Grams)                  44434.6


      Ideal Body Weight                          148


      % Ideal Body Weight                        88


      Body Mass Index (BMI)                      20.5


      Recent Weight Change                       No


      Weight Status                              Approriate


     GI Symptoms


      GI Symptoms                                None


      Last BM                                    none noted since admission


      Difficult in:                              None


      Food Allergies                             No


      Cultural/Ethnic/Amish Belief           none indicated


      Usual diet at home                         Soft ROBINSON large portion, 4oz


                                                 HPN BID


      Skin Integrity/Comment:                    Intact, Sharif 19


      Current %PO                                Good (%)


     Estimated Nutritional Goals


      BEE in Kcals:                              Adj wt of IBW


      Calories/Kcals/Kg                          IBW 67.2kg 25-30 kcal/kg


      Kcals Calculated                           ~1973-1527 kcal/day


      Protein:                                   Adj wt of IBW


      Protein g/k-1.2 gm/kg


      Protein Calculated                         ~65-80 gm/day


      Fluid: ml                                  ~3564-1939 ml/day (1 ml/kcal)


     Nutritional Problem


      2. Problem


       Problem                                   Altered nutrition related lab


                                                 values related to


       Etiology                                  electrolyte imbalance aeb


       Signs/Symptoms:                           Na 134


      1. Problem


       Problem                                   Underweight related to


       Etiology                                  possible poor intake prior to


                                                 admission aeb


       Signs/Symptoms:                           88% IBW


     Intervention/Recommendation


      Comments                                   1. Continue mechanical soft,


                                                 chopped, no added sodium, high


                                                 protein diet as tolerated by


                                                 patient.


                                                 2. Provide Ensure Enlive BID


                                                 to better meet nutrient needs


                                                 due to wt.


                                                 3. If patient remains


                                                 hyponatremic, consider fluid


                                                 restriction.


     Expected Outcomes/Goals


      Expected Outcomes/Goals                    Adequate nutrition to meet >75


                                                 % estimated needs, improved


                                                 labs,  skin remains intact,


                                                 weight maintenance or trend


                                                 toward ideal body weight.


                                                 F/U MR -

## 2019-07-15 RX ADMIN — IPRATROPIUM BROMIDE SCH MG: 0.5 SOLUTION RESPIRATORY (INHALATION) at 19:31

## 2019-07-15 RX ADMIN — PANTOPRAZOLE SODIUM SCH MG: 40 TABLET, DELAYED RELEASE ORAL at 08:49

## 2019-07-15 RX ADMIN — IPRATROPIUM BROMIDE SCH MG: 0.5 SOLUTION RESPIRATORY (INHALATION) at 15:21

## 2019-07-15 RX ADMIN — ALUMINUM HYDROXIDE, MAGNESIUM HYDROXIDE, AND SIMETHICONE PRN ML: 200; 200; 20 SUSPENSION ORAL at 02:10

## 2019-07-15 NOTE — PROGRESS NOTES
DATE:  07/15/2019



SUBJECTIVE:  Chart reviewed and the patient interviewed.  Also discussed the

patient's condition with the staff and reviewed records and labs.  The patient

continued to be suspicious and paranoid.  The patient also is still guarded and

does not want to talk much about himself for feelings.  Also, he is still easily

irritable and easily agitated.  Otherwise, the patient is compliant with taking

his medications with no side effects of medications.



ASSESSMENT:  The patient is still agitated and in irritable mood and needs close

monitoring.



TREATMENT PLAN:  Continue to monitor his behavior and condition closely.  Also,

continue adjusting psychotropic medications and work on behavioral modification.





DD: 07/15/2019 06:33

DT: 07/15/2019 09:39

JOB# [id number]  [id number]

## 2019-07-15 NOTE — INTERNAL MEDICINE PROG NOTE
Internal Medicine Subjective





- Subjective


Service Date: 07/15/19


Patient seen and examined:: with staff (SHE IS DOING WELL)


Patient is:: awake, verbal, in bed, confused


Per staff patient has:: no adverse event





Internal Medicine Objective





- Results


Result Diagrams: 


 19 01:07





 19 01:07


Recent Labs: 


 Laboratory Last Values











WBC  7.4 Th/cmm (4.8-10.8)   19  01:07    


 


RBC  4.18 Mil/cmm (3.80-5.80)   19  01:07    


 


Hgb  12.2 gm/dL (12-16)   19  01:07    


 


Hct  36.3 % (41.0-60)  L  19  01:07    


 


MCV  87.0 fl (80-99)   19  01:07    


 


MCH  29.3 pg (27.0-31.0)   19  01:07    


 


MCHC Differential  33.7 pg (28.0-36.0)   19  01:07    


 


RDW  14.5 % (11.5-20.0)   19  01:07    


 


Plt Count  284 Th/cmm (150-400)   19  01:07    


 


MPV  7.2 fl  19  01:07    


 


Neutrophils %  45.8 % (40.0-80.0)   19  01:07    


 


Lymphocytes %  38.3 % (20.0-50.0)   19  01:07    


 


Monocytes %  11.1 % (2.0-10.0)  H  19  01:07    


 


Eosinophils %  4.0 % (0.0-5.0)   19  01:07    


 


Basophils %  0.8 % (0.0-2.0)   19  01:07    


 


Sodium  134 mEq/L (136-145)  L  19  01:07    


 


Potassium  4.1 mEq/L (3.5-5.1)   19  01:07    


 


Chloride  101 mEq/L ()   19  01:07    


 


Carbon Dioxide  25.8 mEq/L (21.0-31.0)   19  01:07    


 


Anion Gap  11.3  (7.0-16.0)   19  01:07    


 


BUN  25 mg/dL (7-25)   19  01:07    


 


Creatinine  1.0 mg/dL (0.7-1.3)   19  01:07    


 


Est GFR ( Amer)  > 60.0 ml/min (>90)   19  01:07    


 


Est GFR (Non-Af Amer)  > 60.0 ml/min  19  01:07    


 


BUN/Creatinine Ratio  25.0   19  01:07    


 


Glucose  99 mg/dL ()   19  01:07    


 


Calcium  9.3 mg/dL (8.6-10.3)   19  01:07    


 


Total Bilirubin  0.4 mg/dL (0.3-1.0)   19  01:07    


 


AST  15 U/L (13-39)   19  01:07    


 


ALT  20 U/L (7-52)   19  01:07    


 


Alkaline Phosphatase  90 U/L ()   19  01:07    


 


Total Protein  6.4 gm/dL (6.0-8.3)   19  01:07    


 


Albumin  3.8 gm/dL (4.2-5.5)  L  19  01:07    


 


Globulin  2.6 gm/dL  19  01:07    


 


Albumin/Globulin Ratio  1.5  (1.0-1.8)   19  01:07    


 


Triglycerides  73 mg/dL (<150)   19  01:07    


 


Cholesterol  156 mg/dL (<200)   19  01:07    


 


LDL Cholesterol Direct  101 mg/dL ()   19  01:07    


 


HDL Cholesterol  48 mg/dL (23-92)   19  01:07    


 


TSH  2.65 uIU/ml (0.34-5.60)   19  01:07    


 


Urine Source  CLEAN C   19  02:21    


 


Urine Color  YELLOW   19  02:21    


 


Urine Clarity  CLEAR  (CLEAR)   19  02:21    


 


Urine pH  5.5  (4.6 - 8.0)   19  02:21    


 


Ur Specific Gravity  1.020  (1.005-1.030)   19  02:21    


 


Urine Protein  NEGATIVE mg/dL (NEGATIVE)   19  02:21    


 


Urine Glucose (UA)  NEGATIVE mg/dL (NEGATIVE)   19  02:21    


 


Urine Ketones  NEGATIVE mg/dL (NEGATIVE)   19  02:21    


 


Urine Blood  TRACE  (NEGATIVE)   19  02:21    


 


Urine Nitrate  NEGATIVE  (NEGATIVE)   19  02:21    


 


Urine Bilirubin  NEGATIVE  (NEGATIVE)   19  02:21    


 


Urine Urobilinogen  0.2 E.U./dL (0.2 - 1.0)   19  02:21    


 


Ur Leukocyte Esterase  NEGATIVE  (NEGATIVE)   19  02:21    


 


Urine RBC  2-5 /hpf (0-5)  H  19  02:21    


 


Urine WBC  0-2 /hpf (0-5)   19  02:21    


 


Ur Epithelial Cells  NONE SEEN /lpf (FEW)   19  02:21    


 


Urine Bacteria  FEW /hpf (NONE SEEN)   19  02:21    


 


RPR  NONREACTIVE  (NONREACTIVE)   19  01:07    














- Physical Exam


Vitals and I&O: 


 Vital Signs











Temp  98.1 F   07/15/19 14:00


 


Pulse  82   07/15/19 15:22


 


Resp  18   07/15/19 15:22


 


BP  99/71   07/15/19 14:00


 


Pulse Ox  99   07/15/19 15:22








 Intake & Output











 07/14/19 07/15/19 07/15/19





 18:59 06:59 18:59


 


Intake Total 1400 240 


 


Balance 1400 240 


 


Intake:   


 


  Oral 1400 240 


 


Other:   


 


  # Voids 4 2 


 


  # Bowel Movements 0  











Active Medications: 


Current Medications





Acetaminophen (Tylenol)  650 mg PO Q4HR PRN


   PRN Reason: Pain or Fever >101


   Stop: 19 20:28


Al Hydrox/Mg Hydrox/Simethicone (Maalox)  30 ml PO Q4HR PRN


   PRN Reason: GI DISTRESS


   Stop: 19 02:31


   Last Admin: 07/15/19 02:10 Dose:  30 ml


Benztropine Mesylate (Cogentin)  2 mg PO BID [facility]


   Stop: 19 08:59


   Last Admin: 07/15/19 16:21 Dose:  2 mg


Docusate Sodium (Colace)  250 mg PO DAILY [facility]


   Stop: 19 08:59


   Last Admin: 07/15/19 08:50 Dose:  250 mg


Folic Acid (Folate)  0.5 mg PO DAILY [facility]


   Stop: 19 08:59


   Last Admin: 07/15/19 08:50 Dose:  0.5 mg


Haloperidol Decanoate (Haldol Dec)  100 mg IM QMONTH [facility]


   Stop: 10/04/19 08:59


Ipratropium Bromide (Atrovent Neb 0.5mg/2.5ml)  0.5 mg HHN Q6HRT [facility]


   Stop: 19 12:59


   Last Admin: 07/15/19 15:21 Dose:  0.5 mg


Lorazepam (Ativan)  0.5 mg PO Q4HR PRN; Protocol


   PRN Reason: Agitation


   Stop: 19 02:31


Magnesium Hydroxide (Milk Of Magnesia)  30 ml PO HS PRN


   PRN Reason: Constipation


Pantoprazole Sodium (Protonix)  40 mg PO DAILY [facility]


   Stop: 19 06:29


   Last Admin: 07/15/19 08:49 Dose:  40 mg


Quetiapine Fumarate (Seroquel)  12.5 mg PO DAILY [facility]; Protocol


   Stop: 19 08:59


   Last Admin: 07/15/19 08:51 Dose:  12.5 mg


Tamsulosin HCl (Flomax)  0.4 mg PO DAILY [facility]


   Stop: 19 08:59


   Last Admin: 07/15/19 08:54 Dose:  0.4 mg


Zolpidem Tartrate (Ambien)  5 mg PO HSMR1 PRN


   PRN Reason: Insomnia


   Stop: 19 02:31








General: demented


HEENT: NC/AT, PERRLA, EOMI, anicteric sclerae, throat clear


Neck: Supple, No JVD, No thyromegaly, +2 carotid pulse wo bruit, No LAD


Lungs: CTAB


Cardiovascular: RRR, Normal S1, Normal S2, without murmur


Abdomen: soft, non-tender, non-distended


Extremities: clear


Neurological: no change





Internal Medicine Assmt/Plan





- Assessment


Assessment: 





1.BPH


2.DJD.


3.CHRONIC CONSTIPATION.


4.PSYCHOSIS





- Plan


Plan: 





CONTINUE ON CURRENT5 MEDICATION AND DIET.





Nutritional Asmnt/Malnutr-PDOC





- Dietary Evaluation


Malnutrition Findings (Please click <Entered> for more info): 








Nutritional Asmnt/Malnutrition                             Start:  19 08:

31


Text:                                                      Status: Complete    

  


Freq:                                                                          

  


Protocol:                                                                      

  


 Document     19 08:33  TAMARDEBBY  (Rec: 19 08:47  TAMARDEBBY SALOMON-

FNS1)


 Nutritional Asmnt/Malnutrition


     Patient General Information


      Nutritional Screening                      High Risk


                                                 Consult


      Diagnosis                                  Schizophrenia, bipolar


      Pertinent Medical Hx/Surgical Hx           SCHIZOPHRENIA BIPOLAR UTI BPH


                                                 EPILEPSY ANEMIA ANOREXIA


      Subjective Information                     Patient [facility] from [facility].


                                                 Missing broken teeth. Patient


                                                 in activity room at time of


                                                 visit. Tolerating current diet


                                                 order.


      Current Diet Order/ Nutrition Support      Mechanical soft, chopped, no


                                                 added sodium, high protein


      Patient / S.O                              Not Indicated


      Pertinent Medications                      maalox, colace, folate, MOM,


                                                 Protonix


      Pertinent Labs                             () Na 134, Albumin 3.8


     Nutritional Hx/Data


      Height                                     1.7 m


      Height (Calculated Centimeters)            170.2


      Current Weight (lbs)                       59.421 kg


      Weight (Calculated Kilograms)              59.4


      Weight (Calculated Grams)                  37588.6


      Ideal Body Weight                          148


      % Ideal Body Weight                        88


      Body Mass Index (BMI)                      20.5


      Recent Weight Change                       No


      Weight Status                              Approriate


     GI Symptoms


      GI Symptoms                                None


      Last BM                                    none noted since admission


      Difficult in:                              None


      Food Allergies                             No


      Cultural/Ethnic/[employer] Belief           none indicated


      Usual diet at home                         Soft ROBINSON large portion, 4oz


                                                 HPN BID


      Skin Integrity/Comment:                    Intact, Sharif 19


      Current %PO                                Good (%)


     Estimated Nutritional Goals


      BEE in Kcals:                              Adj wt of IBW


      Calories/Kcals/Kg                          IBW 67.2kg 25-30 kcal/kg


      Kcals Calculated                           ~5468-5335 kcal/day


      Protein:                                   Adj wt of IBW


      Protein g/k-1.2 gm/kg


      Protein Calculated                         ~65-80 gm/day


      Fluid: ml                                  ~0665-5683 ml/day (1 ml/kcal)


     Nutritional Problem


      2. Problem


       Problem                                   Altered nutrition related lab


                                                 values related to


       Etiology                                  electrolyte imbalance aeb


       Signs/Symptoms:                           Na 134


      1. Problem


       Problem                                   Underweight related to


       Etiology                                  possible poor intake prior to


                                                 admission aeb


       Signs/Symptoms:                           88% IBW


     Intervention/Recommendation


      Comments                                   1. Continue mechanical soft,


                                                 chopped, no added sodium, high


                                                 protein diet as tolerated by


                                                 patient.


                                                 2. Provide Ensure Enlive BID


                                                 to better meet nutrient needs


                                                 due to wt.


                                                 3. If patient remains


                                                 hyponatremic, consider fluid


                                                 restriction.


     Expected Outcomes/Goals


      Expected Outcomes/Goals                    Adequate nutrition to meet >75


                                                 % estimated needs, improved


                                                 labs,  skin remains intact,


                                                 weight maintenance or trend


                                                 toward ideal body weight.


                                                 F/U MR -

## 2019-07-16 RX ADMIN — IPRATROPIUM BROMIDE SCH MG: 0.5 SOLUTION RESPIRATORY (INHALATION) at 18:55

## 2019-07-16 RX ADMIN — IPRATROPIUM BROMIDE SCH MG: 0.5 SOLUTION RESPIRATORY (INHALATION) at 12:07

## 2019-07-16 RX ADMIN — IPRATROPIUM BROMIDE SCH MG: 0.5 SOLUTION RESPIRATORY (INHALATION) at 07:12

## 2019-07-16 RX ADMIN — IPRATROPIUM BROMIDE SCH MG: 0.5 SOLUTION RESPIRATORY (INHALATION) at 00:10

## 2019-07-16 RX ADMIN — PANTOPRAZOLE SODIUM SCH MG: 40 TABLET, DELAYED RELEASE ORAL at 09:26

## 2019-07-16 NOTE — INTERNAL MEDICINE PROG NOTE
Internal Medicine Subjective





- Subjective


Service Date: 19


Patient seen and examined:: without staff (SHE IS DOING WELL)


Patient is:: awake, verbal, in bed, confused


Per staff patient has:: no adverse event





Internal Medicine Objective





- Results


Result Diagrams: 


 19 01:07





 19 01:07


Recent Labs: 


 Laboratory Last Values











WBC  7.4 Th/cmm (4.8-10.8)   19  01:07    


 


RBC  4.18 Mil/cmm (3.80-5.80)   19  01:07    


 


Hgb  12.2 gm/dL (12-16)   19  01:07    


 


Hct  36.3 % (41.0-60)  L  19  01:07    


 


MCV  87.0 fl (80-99)   19  01:07    


 


MCH  29.3 pg (27.0-31.0)   19  01:07    


 


MCHC Differential  33.7 pg (28.0-36.0)   19  01:07    


 


RDW  14.5 % (11.5-20.0)   19  01:07    


 


Plt Count  284 Th/cmm (150-400)   19  01:07    


 


MPV  7.2 fl  19  01:07    


 


Neutrophils %  45.8 % (40.0-80.0)   19  01:07    


 


Lymphocytes %  38.3 % (20.0-50.0)   19  01:07    


 


Monocytes %  11.1 % (2.0-10.0)  H  19  01:07    


 


Eosinophils %  4.0 % (0.0-5.0)   19  01:07    


 


Basophils %  0.8 % (0.0-2.0)   19  01:07    


 


Sodium  134 mEq/L (136-145)  L  19  01:07    


 


Potassium  4.1 mEq/L (3.5-5.1)   19  01:07    


 


Chloride  101 mEq/L ()   19  01:07    


 


Carbon Dioxide  25.8 mEq/L (21.0-31.0)   19  01:07    


 


Anion Gap  11.3  (7.0-16.0)   19  01:07    


 


BUN  25 mg/dL (7-25)   19  01:07    


 


Creatinine  1.0 mg/dL (0.7-1.3)   19  01:07    


 


Est GFR ( Amer)  > 60.0 ml/min (>90)   19  01:07    


 


Est GFR (Non-Af Amer)  > 60.0 ml/min  19  01:07    


 


BUN/Creatinine Ratio  25.0   19  01:07    


 


Glucose  99 mg/dL ()   19  01:07    


 


Calcium  9.3 mg/dL (8.6-10.3)   19  01:07    


 


Total Bilirubin  0.4 mg/dL (0.3-1.0)   19  01:07    


 


AST  15 U/L (13-39)   19  01:07    


 


ALT  20 U/L (7-52)   19  01:07    


 


Alkaline Phosphatase  90 U/L ()   19  01:07    


 


Total Protein  6.4 gm/dL (6.0-8.3)   19  01:07    


 


Albumin  3.8 gm/dL (4.2-5.5)  L  19  01:07    


 


Globulin  2.6 gm/dL  19  01:07    


 


Albumin/Globulin Ratio  1.5  (1.0-1.8)   19  01:07    


 


Triglycerides  73 mg/dL (<150)   19  01:07    


 


Cholesterol  156 mg/dL (<200)   19  01:07    


 


LDL Cholesterol Direct  101 mg/dL ()   19  01:07    


 


HDL Cholesterol  48 mg/dL (23-92)   19  01:07    


 


TSH  2.65 uIU/ml (0.34-5.60)   19  01:07    


 


Urine Source  CLEAN C   19  02:21    


 


Urine Color  YELLOW   19  02:21    


 


Urine Clarity  CLEAR  (CLEAR)   19  02:21    


 


Urine pH  5.5  (4.6 - 8.0)   19  02:21    


 


Ur Specific Gravity  1.020  (1.005-1.030)   19  02:21    


 


Urine Protein  NEGATIVE mg/dL (NEGATIVE)   19  02:21    


 


Urine Glucose (UA)  NEGATIVE mg/dL (NEGATIVE)   19  02:21    


 


Urine Ketones  NEGATIVE mg/dL (NEGATIVE)   19  02:21    


 


Urine Blood  TRACE  (NEGATIVE)   19  02:21    


 


Urine Nitrate  NEGATIVE  (NEGATIVE)   19  02:21    


 


Urine Bilirubin  NEGATIVE  (NEGATIVE)   19  02:21    


 


Urine Urobilinogen  0.2 E.U./dL (0.2 - 1.0)   19  02:21    


 


Ur Leukocyte Esterase  NEGATIVE  (NEGATIVE)   19  02:21    


 


Urine RBC  2-5 /hpf (0-5)  H  19  02:21    


 


Urine WBC  0-2 /hpf (0-5)   19  02:21    


 


Ur Epithelial Cells  NONE SEEN /lpf (FEW)   19  02:21    


 


Urine Bacteria  FEW /hpf (NONE SEEN)   19  02:21    


 


RPR  NONREACTIVE  (NONREACTIVE)   19  01:07    














- Physical Exam


Vitals and I&O: 


 Vital Signs











Temp  97.5 F   19 16:21


 


Pulse  81   19 18:56


 


Resp  18   19 18:56


 


BP  126/76   19 16:21


 


Pulse Ox  100   19 18:56








 Intake & Output











 19





 06:59 18:59 06:59


 


Intake Total 120  


 


Balance 120  


 


Intake:   


 


  Oral 120  


 


Other:   


 


  # Voids 3  


 


  # Bowel Movements 0  











Active Medications: 


Current Medications





Acetaminophen (Tylenol)  650 mg PO Q4HR PRN


   PRN Reason: Pain or Fever >101


   Stop: 19 20:28


Al Hydrox/Mg Hydrox/Simethicone (Maalox)  30 ml PO Q4HR PRN


   PRN Reason: GI DISTRESS


   Stop: 19 02:31


   Last Admin: 07/15/19 02:10 Dose:  30 ml


Benztropine Mesylate (Cogentin)  1 mg PO DAILY [facility]


   Stop: 19 08:59


   Last Admin: 19 09:26 Dose:  1 mg


Docusate Sodium (Colace)  250 mg PO DAILY [facility]


   Stop: 19 08:59


   Last Admin: 19 09:26 Dose:  250 mg


Folic Acid (Folate)  0.5 mg PO DAILY [facility]


   Stop: 19 08:59


   Last Admin: 19 09:27 Dose:  0.5 mg


Haloperidol Decanoate (Haldol Dec)  100 mg IM QMONTH [facility]


   Stop: 10/04/19 08:59


Ipratropium Bromide (Atrovent Neb 0.5mg/2.5ml)  0.5 mg HHN Q6HRT [facility]


   Stop: 19 12:59


   Last Admin: 19 18:55 Dose:  0.5 mg


Lorazepam (Ativan)  0.5 mg PO Q4HR PRN; Protocol


   PRN Reason: Agitation


   Stop: 19 02:31


   Last Admin: 19 09:26 Dose:  0.5 mg


Magnesium Hydroxide (Milk Of Magnesia)  30 ml PO HS PRN


   PRN Reason: Constipation


Pantoprazole Sodium (Protonix)  40 mg PO DAILY [facility]


   Stop: 19 06:29


   Last Admin: 19 09:26 Dose:  40 mg


Quetiapine Fumarate (Seroquel)  12.5 mg PO DAILY [facility]; Protocol


   Stop: 19 08:59


   Last Admin: 19 09:25 Dose:  12.5 mg


Tamsulosin HCl (Flomax)  0.4 mg PO DAILY [facility]


   Stop: 19 08:59


   Last Admin: 19 09:27 Dose:  0.4 mg


Zolpidem Tartrate (Ambien)  5 mg PO HSMR1 PRN


   PRN Reason: Insomnia


   Stop: 19 02:31








General: demented


HEENT: NC/AT, PERRLA, EOMI, anicteric sclerae, throat clear


Neck: Supple, No JVD, No thyromegaly, +2 carotid pulse wo bruit, No LAD


Lungs: CTAB


Cardiovascular: RRR, Normal S1, Normal S2, without murmur


Abdomen: soft, non-tender, non-distended


Extremities: clear


Neurological: no change





Internal Medicine Assmt/Plan





- Assessment


Assessment: 





1.BPH


2.DJD.


3.CHRONIC CONSTIPATION.


4.PSYCHOSIS





- Plan


Plan: 





CONTINUE ON CURRENT5 MEDICATION AND DIET.





Nutritional Asmnt/Malnutr-PDOC





- Dietary Evaluation


Malnutrition Findings (Please click <Entered> for more info): 








Nutritional Asmnt/Malnutrition                             Start:  19 08:

31


Text:                                                      Status: Complete    

  


Freq:                                                                          

  


Protocol:                                                                      

  


 Document     19 08:33  TAMARDEBBY  (Rec: 19 08:47  CATE  MUM-

FNS1)


 Nutritional Asmnt/Malnutrition


     Patient General Information


      Nutritional Screening                      High Risk


                                                 Consult


      Diagnosis                                  Schizophrenia, bipolar


      Pertinent Medical Hx/Surgical Hx           SCHIZOPHRENIA BIPOLAR UTI BPH


                                                 EPILEPSY ANEMIA ANOREXIA


      Subjective Information                     Patient was admitted from SNF.


                                                 Missing broken teeth. Patient


                                                 in activity room at time of


                                                 visit. Tolerating current diet


                                                 order.


      Current Diet Order/ Nutrition Support      Mechanical soft, chopped, no


                                                 added sodium, high protein


      Patient / S.O                              Not Indicated


      Pertinent Medications                      maalox, colace, folate, MOM,


                                                 Protonix


      Pertinent Labs                             () Na 134, Albumin 3.8


     Nutritional Hx/Data


      Height                                     1.7 m


      Height (Calculated Centimeters)            170.2


      Current Weight (lbs)                       59.421 kg


      Weight (Calculated Kilograms)              59.4


      Weight (Calculated Grams)                  06929.6


      Ideal Body Weight                          148


      % Ideal Body Weight                        88


      Body Mass Index (BMI)                      20.5


      Recent Weight Change                       No


      Weight Status                              Approriate


     GI Symptoms


      GI Symptoms                                None


      Last BM                                    none noted since admission


      Difficult in:                              None


      Food Allergies                             No


      Cultural/Ethnic/[employer]regation Belief           none indicated


      Usual diet at home                         Soft ROBINSON large portion, 4oz


                                                 HPN BID


      Skin Integrity/Comment:                    Intact, Sharif 19


      Current %PO                                Good (%)


     Estimated Nutritional Goals


      BEE in Kcals:                              Adj wt of IBW


      Calories/Kcals/Kg                          IBW 67.2kg 25-30 kcal/kg


      Kcals Calculated                           ~0505-9300 kcal/day


      Protein:                                   Adj wt of IBW


      Protein g/k-1.2 gm/kg


      Protein Calculated                         ~65-80 gm/day


      Fluid: ml                                  ~7376-3418 ml/day (1 ml/kcal)


     Nutritional Problem


      2. Problem


       Problem                                   Altered nutrition related lab


                                                 values related to


       Etiology                                  electrolyte imbalance aeb


       Signs/Symptoms:                           Na 134


      1. Problem


       Problem                                   Underweight related to


       Etiology                                  possible poor intake prior to


                                                 admission aeb


       Signs/Symptoms:                           88% IBW


     Intervention/Recommendation


      Comments                                   1. Continue mechanical soft,


                                                 chopped, no added sodium, high


                                                 protein diet as tolerated by


                                                 patient.


                                                 2. Provide Ensure Enlive BID


                                                 to better meet nutrient needs


                                                 due to wt.


                                                 3. If patient remains


                                                 hyponatremic, consider fluid


                                                 restriction.


     Expected Outcomes/Goals


      Expected Outcomes/Goals                    Adequate nutrition to meet >75


                                                 % estimated needs, improved


                                                 labs,  skin remains intact,


                                                 weight maintenance or trend


                                                 toward ideal body weight.


                                                 F/U MR -

## 2019-07-17 RX ADMIN — IPRATROPIUM BROMIDE SCH MG: 0.5 SOLUTION RESPIRATORY (INHALATION) at 18:30

## 2019-07-17 RX ADMIN — IPRATROPIUM BROMIDE SCH MG: 0.5 SOLUTION RESPIRATORY (INHALATION) at 08:47

## 2019-07-17 RX ADMIN — IPRATROPIUM BROMIDE SCH MG: 0.5 SOLUTION RESPIRATORY (INHALATION) at 00:39

## 2019-07-17 RX ADMIN — PANTOPRAZOLE SODIUM SCH MG: 40 TABLET, DELAYED RELEASE ORAL at 09:05

## 2019-07-17 RX ADMIN — IPRATROPIUM BROMIDE SCH MG: 0.5 SOLUTION RESPIRATORY (INHALATION) at 13:25

## 2019-07-17 NOTE — INTERNAL MEDICINE PROG NOTE
Internal Medicine Subjective





- Subjective


Service Date: 19


Patient seen and examined:: without staff (SHE IS DOING BETTER)


Patient is:: awake, verbal, in bed, confused


Per staff patient has:: no adverse event





Internal Medicine Objective





- Results


Result Diagrams: 


 19 01:07





 19 01:07


Recent Labs: 


 Laboratory Last Values











WBC  7.4 Th/cmm (4.8-10.8)   19  01:07    


 


RBC  4.18 Mil/cmm (3.80-5.80)   19  01:07    


 


Hgb  12.2 gm/dL (12-16)   19  01:07    


 


Hct  36.3 % (41.0-60)  L  19  01:07    


 


MCV  87.0 fl (80-99)   19  01:07    


 


MCH  29.3 pg (27.0-31.0)   19  01:07    


 


MCHC Differential  33.7 pg (28.0-36.0)   19  01:07    


 


RDW  14.5 % (11.5-20.0)   19  01:07    


 


Plt Count  284 Th/cmm (150-400)   19  01:07    


 


MPV  7.2 fl  19  01:07    


 


Neutrophils %  45.8 % (40.0-80.0)   19  01:07    


 


Lymphocytes %  38.3 % (20.0-50.0)   19  01:07    


 


Monocytes %  11.1 % (2.0-10.0)  H  19  01:07    


 


Eosinophils %  4.0 % (0.0-5.0)   19  01:07    


 


Basophils %  0.8 % (0.0-2.0)   19  01:07    


 


Sodium  134 mEq/L (136-145)  L  19  01:07    


 


Potassium  4.1 mEq/L (3.5-5.1)   19  01:07    


 


Chloride  101 mEq/L ()   19  01:07    


 


Carbon Dioxide  25.8 mEq/L (21.0-31.0)   19  01:07    


 


Anion Gap  11.3  (7.0-16.0)   19  01:07    


 


BUN  25 mg/dL (7-25)   19  01:07    


 


Creatinine  1.0 mg/dL (0.7-1.3)   19  01:07    


 


Est GFR ( Amer)  > 60.0 ml/min (>90)   19  01:07    


 


Est GFR (Non-Af Amer)  > 60.0 ml/min  19  01:07    


 


BUN/Creatinine Ratio  25.0   19  01:07    


 


Glucose  99 mg/dL ()   19  01:07    


 


Calcium  9.3 mg/dL (8.6-10.3)   19  01:07    


 


Total Bilirubin  0.4 mg/dL (0.3-1.0)   19  01:07    


 


AST  15 U/L (13-39)   19  01:07    


 


ALT  20 U/L (7-52)   19  01:07    


 


Alkaline Phosphatase  90 U/L ()   19  01:07    


 


Total Protein  6.4 gm/dL (6.0-8.3)   19  01:07    


 


Albumin  3.8 gm/dL (4.2-5.5)  L  19  01:07    


 


Globulin  2.6 gm/dL  19  01:07    


 


Albumin/Globulin Ratio  1.5  (1.0-1.8)   19  01:07    


 


Triglycerides  73 mg/dL (<150)   19  01:07    


 


Cholesterol  156 mg/dL (<200)   19  01:07    


 


LDL Cholesterol Direct  101 mg/dL ()   19  01:07    


 


HDL Cholesterol  48 mg/dL (23-92)   19  01:07    


 


TSH  2.65 uIU/ml (0.34-5.60)   19  01:07    


 


Urine Source  CLEAN C   19  02:21    


 


Urine Color  YELLOW   19  02:21    


 


Urine Clarity  CLEAR  (CLEAR)   19  02:21    


 


Urine pH  5.5  (4.6 - 8.0)   19  02:21    


 


Ur Specific Gravity  1.020  (1.005-1.030)   19  02:21    


 


Urine Protein  NEGATIVE mg/dL (NEGATIVE)   19  02:21    


 


Urine Glucose (UA)  NEGATIVE mg/dL (NEGATIVE)   19  02:21    


 


Urine Ketones  NEGATIVE mg/dL (NEGATIVE)   19  02:21    


 


Urine Blood  TRACE  (NEGATIVE)   19  02:21    


 


Urine Nitrate  NEGATIVE  (NEGATIVE)   19  02:21    


 


Urine Bilirubin  NEGATIVE  (NEGATIVE)   19  02:21    


 


Urine Urobilinogen  0.2 E.U./dL (0.2 - 1.0)   19  02:21    


 


Ur Leukocyte Esterase  NEGATIVE  (NEGATIVE)   19  02:21    


 


Urine RBC  2-5 /hpf (0-5)  H  19  02:21    


 


Urine WBC  0-2 /hpf (0-5)   19  02:21    


 


Ur Epithelial Cells  NONE SEEN /lpf (FEW)   19  02:21    


 


Urine Bacteria  FEW /hpf (NONE SEEN)   19  02:21    


 


RPR  NONREACTIVE  (NONREACTIVE)   19  01:07    














- Physical Exam


Vitals and I&O: 


 Vital Signs











Temp  97.5 F   19 14:52


 


Pulse  82   19 19:20


 


Resp  20   19 19:20


 


BP  131/73   19 14:52


 


Pulse Ox  98   19 19:20








 Intake & Output











 19





 06:59 18:59 06:59


 


Other:   


 


  # Voids 3 2 


 


  # Bowel Movements 0 1 











Active Medications: 


Current Medications





Acetaminophen (Tylenol)  650 mg PO Q4HR PRN


   PRN Reason: Pain or Fever >101


   Stop: 19 20:28


Al Hydrox/Mg Hydrox/Simethicone (Maalox)  30 ml PO Q4HR PRN


   PRN Reason: GI DISTRESS


   Stop: 19 02:31


   Last Admin: 07/15/19 02:10 Dose:  30 ml


Benztropine Mesylate (Cogentin)  1 mg PO DAILY [facility]


   Stop: 19 08:59


   Last Admin: 19 09:05 Dose:  1 mg


Docusate Sodium (Colace)  250 mg PO DAILY [facility]


   Stop: 19 08:59


   Last Admin: 19 09:06 Dose:  250 mg


Folic Acid (Folate)  0.5 mg PO DAILY [facility]


   Stop: 19 08:59


   Last Admin: 19 09:06 Dose:  0.5 mg


Haloperidol Decanoate (Haldol Dec)  100 mg IM QMONTH [facility]


   Stop: 10/04/19 08:59


Ipratropium Bromide (Atrovent Neb 0.5mg/2.5ml)  0.5 mg HHN Q6HRT [facility]


   Stop: 19 12:59


   Last Admin: 19 18:30 Dose:  0.5 mg


Lorazepam (Ativan)  0.5 mg PO Q4HR PRN; Protocol


   PRN Reason: Agitation


   Stop: 19 02:31


   Last Admin: 19 09:26 Dose:  0.5 mg


Magnesium Hydroxide (Milk Of Magnesia)  30 ml PO HS PRN


   PRN Reason: Constipation


Pantoprazole Sodium (Protonix)  40 mg PO DAILY [facility]


   Stop: 19 06:29


   Last Admin: 19 09:05 Dose:  40 mg


Quetiapine Fumarate (Seroquel)  12.5 mg PO DAILY [facility]; Protocol


   Stop: 19 08:59


   Last Admin: 19 09:07 Dose:  12.5 mg


Tamsulosin HCl (Flomax)  0.4 mg PO DAILY [facility]


   Stop: 19 08:59


   Last Admin: 19 09:09 Dose:  0.4 mg


Zolpidem Tartrate (Ambien)  5 mg PO HSMR1 PRN


   PRN Reason: Insomnia


   Stop: 19 02:31








General: demented


HEENT: NC/AT, PERRLA, EOMI, anicteric sclerae, throat clear


Neck: Supple, No JVD, No thyromegaly, +2 carotid pulse wo bruit, No LAD


Lungs: CTAB


Cardiovascular: RRR, Normal S1, Normal S2, without murmur


Abdomen: soft, non-tender, non-distended


Extremities: clear


Neurological: no change





Internal Medicine Assmt/Plan





- Assessment


Assessment: 





1.BPH


2.DJD.


3.CHRONIC CONSTIPATION.


4.PSYCHOSIS





- Plan


Plan: 





CONTINUE ON CURRENT5 MEDICATION AND DIET.





Nutritional Asmnt/Malnutr-PDOC





- Dietary Evaluation


Malnutrition Findings (Please click <Entered> for more info): 








Nutritional Asmnt/Malnutrition                             Start:  19 08:

31


Text:                                                      Status: Complete    

  


Freq:                                                                          

  


Protocol:                                                                      

  


 Document     19 08:33  DANIELSAMMIEDEBBY  (Rec: 19 08:47  MMSAMMIEDEBBY SALOMON-

FNS1)


 Nutritional Asmnt/Malnutrition


     Patient General Information


      Nutritional Screening                      High Risk


                                                 Consult


      Diagnosis                                  Schizophrenia, bipolar


      Pertinent Medical Hx/Surgical Hx           SCHIZOPHRENIA BIPOLAR UTI BPH


                                                 EPILEPSY ANEMIA ANOREXIA


      Subjective Information                     Patient was [facility] from SNF.


                                                 Missing broken teeth. Patient


                                                 in activity room at time of


                                                 visit. Tolerating current diet


                                                 order.


      Current Diet Order/ Nutrition Support      Mechanical soft, chopped, no


                                                 added sodium, high protein


      Patient / S.O                              Not Indicated


      Pertinent Medications                      maalox, colace, folate, MOM,


                                                 Protonix


      Pertinent Labs                             () Na 134, Albumin 3.8


     Nutritional Hx/Data


      Height                                     1.7 m


      Height (Calculated Centimeters)            170.2


      Current Weight (lbs)                       59.421 kg


      Weight (Calculated Kilograms)              59.4


      Weight (Calculated Grams)                  51314.6


      Ideal Body Weight                          148


      % Ideal Body Weight                        88


      Body Mass Index (BMI)                      20.5


      Recent Weight Change                       No


      Weight Status                              Approriate


     GI Symptoms


      GI Symptoms                                None


      Last BM                                    none noted since admission


      Difficult in:                              None


      Food Allergies                             No


      Cultural/Ethnic/Catholic Belief           none indicated


      Usual diet at home                         Soft ROBINSON large portion, 4oz


                                                 HPN BID


      Skin Integrity/Comment:                    Sharif Dinh 19


      Current %PO                                Good (%)


     Estimated Nutritional Goals


      BEE in Kcals:                              Adj wt of IBW


      Calories/Kcals/Kg                          IBW 67.2kg 25-30 kcal/kg


      Kcals Calculated                           ~5934-0971 kcal/day


      Protein:                                   Adj wt of IBW


      Protein g/k-1.2 gm/kg


      Protein Calculated                         ~65-80 gm/day


      Fluid: ml                                  ~1444-4888 ml/day (1 ml/kcal)


     Nutritional Problem


      2. Problem


       Problem                                   Altered nutrition related lab


                                                 values related to


       Etiology                                  electrolyte imbalance aeb


       Signs/Symptoms:                           Na 134


      1. Problem


       Problem                                   Underweight related to


       Etiology                                  possible poor intake prior to


                                                 admission aeb


       Signs/Symptoms:                           88% IBW


     Intervention/Recommendation


      Comments                                   1. Continue mechanical soft,


                                                 chopped, no added sodium, high


                                                 protein diet as tolerated by


                                                 patient.


                                                 2. Provide Ensure Enlive BID


                                                 to better meet nutrient needs


                                                 due to wt.


                                                 3. If patient remains


                                                 hyponatremic, consider fluid


                                                 restriction.


     Expected Outcomes/Goals


      Expected Outcomes/Goals                    Adequate nutrition to meet >75


                                                 % estimated needs, improved


                                                 labs,  skin remains intact,


                                                 weight maintenance or trend


                                                 toward ideal body weight.


                                                 F/U MR -

## 2019-07-18 RX ADMIN — IPRATROPIUM BROMIDE SCH: 0.5 SOLUTION RESPIRATORY (INHALATION) at 07:30

## 2019-07-18 RX ADMIN — IPRATROPIUM BROMIDE SCH MG: 0.5 SOLUTION RESPIRATORY (INHALATION) at 00:12

## 2019-07-18 RX ADMIN — PANTOPRAZOLE SODIUM SCH MG: 40 TABLET, DELAYED RELEASE ORAL at 08:43

## 2019-07-18 RX ADMIN — IPRATROPIUM BROMIDE SCH: 0.5 SOLUTION RESPIRATORY (INHALATION) at 13:36

## 2019-07-18 RX ADMIN — IPRATROPIUM BROMIDE SCH MG: 0.5 SOLUTION RESPIRATORY (INHALATION) at 23:48

## 2019-07-18 RX ADMIN — IPRATROPIUM BROMIDE SCH MG: 0.5 SOLUTION RESPIRATORY (INHALATION) at 18:32

## 2019-07-18 NOTE — PROGRESS NOTES
DATE:  07/17/2019



PSYCHIATRIC PROGRESS NOTE 



SUBJECTIVE:  Chart was reviewed and the patient interviewed.  Also discussed the

patient's condition with the staff and reviewed records and labs.  The patient

continued to be in a depressed mood.  The patient is interacting minimally with

peers and others.  The patient also still wants to be left alone and his affect

is constricted.  Otherwise, the patient is compliant with taking his

medications.  The patient is still suspicious and is still paranoid.



ASSESSMENT:  The patient is still depressed and is still withdrawn.



TREATMENT PLAN:  Continue to monitor his behavior and his condition closely. 

Also, continue Lexapro 12.5 mg every day and Ativan on a p.r.n. basis and will

continue to follow up closely.





DD: 07/17/2019 17:30

DT: 07/17/2019 22:47

[facility]# [id number]  [id number]

## 2019-07-18 NOTE — INTERNAL MEDICINE PROG NOTE
Internal Medicine Subjective





- Subjective


Service Date: 19


Patient seen and examined:: without staff (SHE IS DOING WELL)


Patient is:: awake, verbal, in bed, confused


Per staff patient has:: no adverse event





Internal Medicine Objective





- Results


Result Diagrams: 


 19 01:07





 19 01:07


Recent Labs: 


 Laboratory Last Values











WBC  7.4 Th/cmm (4.8-10.8)   19  01:07    


 


RBC  4.18 Mil/cmm (3.80-5.80)   19  01:07    


 


Hgb  12.2 gm/dL (12-16)   19  01:07    


 


Hct  36.3 % (41.0-60)  L  19  01:07    


 


MCV  87.0 fl (80-99)   19  01:07    


 


MCH  29.3 pg (27.0-31.0)   19  01:07    


 


MCHC Differential  33.7 pg (28.0-36.0)   19  01:07    


 


RDW  14.5 % (11.5-20.0)   19  01:07    


 


Plt Count  284 Th/cmm (150-400)   19  01:07    


 


MPV  7.2 fl  19  01:07    


 


Neutrophils %  45.8 % (40.0-80.0)   19  01:07    


 


Lymphocytes %  38.3 % (20.0-50.0)   19  01:07    


 


Monocytes %  11.1 % (2.0-10.0)  H  19  01:07    


 


Eosinophils %  4.0 % (0.0-5.0)   19  01:07    


 


Basophils %  0.8 % (0.0-2.0)   19  01:07    


 


Sodium  134 mEq/L (136-145)  L  19  01:07    


 


Potassium  4.1 mEq/L (3.5-5.1)   19  01:07    


 


Chloride  101 mEq/L ()   19  01:07    


 


Carbon Dioxide  25.8 mEq/L (21.0-31.0)   19  01:07    


 


Anion Gap  11.3  (7.0-16.0)   19  01:07    


 


BUN  25 mg/dL (7-25)   19  01:07    


 


Creatinine  1.0 mg/dL (0.7-1.3)   19  01:07    


 


Est GFR ( Amer)  > 60.0 ml/min (>90)   19  01:07    


 


Est GFR (Non-Af Amer)  > 60.0 ml/min  19  01:07    


 


BUN/Creatinine Ratio  25.0   19  01:07    


 


Glucose  99 mg/dL ()   19  01:07    


 


Calcium  9.3 mg/dL (8.6-10.3)   19  01:07    


 


Total Bilirubin  0.4 mg/dL (0.3-1.0)   19  01:07    


 


AST  15 U/L (13-39)   19  01:07    


 


ALT  20 U/L (7-52)   19  01:07    


 


Alkaline Phosphatase  90 U/L ()   19  01:07    


 


Total Protein  6.4 gm/dL (6.0-8.3)   19  01:07    


 


Albumin  3.8 gm/dL (4.2-5.5)  L  19  01:07    


 


Globulin  2.6 gm/dL  19  01:07    


 


Albumin/Globulin Ratio  1.5  (1.0-1.8)   19  01:07    


 


Triglycerides  73 mg/dL (<150)   19  01:07    


 


Cholesterol  156 mg/dL (<200)   19  01:07    


 


LDL Cholesterol Direct  101 mg/dL ()   19  01:07    


 


HDL Cholesterol  48 mg/dL (23-92)   19  01:07    


 


TSH  2.65 uIU/ml (0.34-5.60)   19  01:07    


 


Urine Source  CLEAN C   19  02:21    


 


Urine Color  YELLOW   19  02:21    


 


Urine Clarity  CLEAR  (CLEAR)   19  02:21    


 


Urine pH  5.5  (4.6 - 8.0)   19  02:21    


 


Ur Specific Gravity  1.020  (1.005-1.030)   19  02:21    


 


Urine Protein  NEGATIVE mg/dL (NEGATIVE)   19  02:21    


 


Urine Glucose (UA)  NEGATIVE mg/dL (NEGATIVE)   19  02:21    


 


Urine Ketones  NEGATIVE mg/dL (NEGATIVE)   19  02:21    


 


Urine Blood  TRACE  (NEGATIVE)   19  02:21    


 


Urine Nitrate  NEGATIVE  (NEGATIVE)   19  02:21    


 


Urine Bilirubin  NEGATIVE  (NEGATIVE)   19  02:21    


 


Urine Urobilinogen  0.2 E.U./dL (0.2 - 1.0)   19  02:21    


 


Ur Leukocyte Esterase  NEGATIVE  (NEGATIVE)   19  02:21    


 


Urine RBC  2-5 /hpf (0-5)  H  19  02:21    


 


Urine WBC  0-2 /hpf (0-5)   19  02:21    


 


Ur Epithelial Cells  NONE SEEN /lpf (FEW)   19  02:21    


 


Urine Bacteria  FEW /hpf (NONE SEEN)   19  02:21    


 


RPR  NONREACTIVE  (NONREACTIVE)   19  01:07    














- Physical Exam


Vitals and I&O: 


 Vital Signs











Temp  97.5 F   19 20:53


 


Pulse  78   19 20:53


 


Resp  20   19 20:53


 


BP  130/76   19 20:53


 


Pulse Ox  98   19 20:53








 Intake & Output











 19





 06:59 18:59 06:59


 


Intake Total 120 1250 240


 


Balance 120 1250 240


 


Intake:   


 


  Oral 120 1250 240


 


Other:   


 


  # Voids 4  2


 


  # Bowel Movements 0 1 0











Active Medications: 


Current Medications





Acetaminophen (Tylenol)  650 mg PO Q4HR PRN


   PRN Reason: Pain or Fever >101


   Stop: 19 20:28


Al Hydrox/Mg Hydrox/Simethicone (Maalox)  30 ml PO Q4HR PRN


   PRN Reason: GI DISTRESS


   Stop: 19 02:31


   Last Admin: 07/15/19 02:10 Dose:  30 ml


Benztropine Mesylate (Cogentin)  1 mg PO DAILY [facility]


   Stop: 19 08:59


   Last Admin: 19 08:43 Dose:  1 mg


Docusate Sodium (Colace)  250 mg PO DAILY [facility]


   Stop: 19 08:59


   Last Admin: 19 08:43 Dose:  250 mg


Folic Acid (Folate)  0.5 mg PO DAILY [facility]


   Stop: 19 08:59


   Last Admin: 19 08:43 Dose:  0.5 mg


Haloperidol Decanoate (Haldol Dec)  100 mg IM QMONTH [facility]


   Stop: 10/04/19 08:59


Ipratropium Bromide (Atrovent Neb 0.5mg/2.5ml)  0.5 mg HHN Q6HRT [facility]


   Stop: 19 12:59


   Last Admin: 19 18:32 Dose:  0.5 mg


Lorazepam (Ativan)  0.5 mg PO Q4HR PRN; Protocol


   PRN Reason: Agitation


   Stop: 19 02:31


   Last Admin: 19 09:26 Dose:  0.5 mg


Magnesium Hydroxide (Milk Of Magnesia)  30 ml PO HS PRN


   PRN Reason: Constipation


Pantoprazole Sodium (Protonix)  40 mg PO DAILY [facility]


   Stop: 19 06:29


   Last Admin: 19 08:43 Dose:  40 mg


Quetiapine Fumarate (Seroquel)  12.5 mg PO DAILY [facility]; Protocol


   Stop: 19 08:59


   Last Admin: 19 08:43 Dose:  12.5 mg


Tamsulosin HCl (Flomax)  0.4 mg PO DAILY [facility]


   Stop: 19 08:59


   Last Admin: 19 08:43 Dose:  0.4 mg


Zolpidem Tartrate (Ambien)  5 mg PO HSMR1 PRN


   PRN Reason: Insomnia


   Stop: 19 02:31








General: demented


HEENT: NC/AT, PERRLA, EOMI, anicteric sclerae, throat clear


Neck: Supple, No JVD, No thyromegaly, +2 carotid pulse wo bruit, No LAD


Lungs: CTAB


Cardiovascular: RRR, Normal S1, Normal S2, without murmur


Abdomen: soft, non-tender, non-distended


Extremities: clear


Neurological: no change





Internal Medicine Assmt/Plan





- Assessment


Assessment: 





1.BPH


2.DJD.


3.CHRONIC CONSTIPATION.


4.PSYCHOSIS





- Plan


Plan: 





CONTINUE ON CURRENT5 MEDICATION AND DIET.





Nutritional Asmnt/Malnutr-PDOC





- Dietary Evaluation


Malnutrition Findings (Please click <Entered> for more info): 








Nutritional Asmnt/Malnutrition                             Start:  19 08:

31


Text:                                                      Status: Complete    

  


Freq:                                                                          

  


Protocol:                                                                      

  


 Document     19 08:33  MMSAMMIEDEBBY  (Rec: 19 08:47  MMULDEBBY SALOMON-

FNS1)


 Nutritional Asmnt/Malnutrition


     Patient General Information


      Nutritional Screening                      High Risk


                                                 Consult


      Diagnosis                                  Schizophrenia, bipolar


      Pertinent Medical Hx/Surgical Hx           SCHIZOPHRENIA BIPOLAR UTI BPH


                                                 EPILEPSY ANEMIA ANOREXIA


      Subjective Information                     Patient was admitted from SNF.


                                                 Missing broken teeth. Patient


                                                 in activity room at time of


                                                 visit. Tolerating current diet


                                                 order.


      Current Diet Order/ Nutrition Support      Mechanical soft, chopped, no


                                                 added sodium, high protein


      Patient / S.O                              Not Indicated


      Pertinent Medications                      maalox, colace, folate, MOM,


                                                 Protonix


      Pertinent Labs                             () Na 134, Albumin 3.8


     Nutritional Hx/Data


      Height                                     1.7 m


      Height (Calculated Centimeters)            170.2


      Current Weight (lbs)                       59.421 kg


      Weight (Calculated Kilograms)              59.4


      Weight (Calculated Grams)                  62661.6


      Ideal Body Weight                          148


      % Ideal Body Weight                        88


      Body Mass Index (BMI)                      20.5


      Recent Weight Change                       No


      Weight Status                              Approriate


     GI Symptoms


      GI Symptoms                                None


      Last BM                                    none noted since admission


      Difficult in:                              None


      Food Allergies                             No


      Cultural/Ethnic/Scientology Belief           none indicated


      Usual diet at home                         Soft ROBINSON large portion, 4oz


                                                 HPN BID


      Skin Integrity/Comment:                    Intact, Sharif 19


      Current %PO                                Good (%)


     Estimated Nutritional Goals


      BEE in Kcals:                              Adj wt of IBW


      Calories/Kcals/Kg                          IBW 67.2kg 25-30 kcal/kg


      Kcals Calculated                           ~5657-8355 kcal/day


      Protein:                                   Adj wt of IBW


      Protein g/k-1.2 gm/kg


      Protein Calculated                         ~65-80 gm/day


      Fluid: ml                                  ~5460-9598 ml/day (1 ml/kcal)


     Nutritional Problem


      2. Problem


       Problem                                   Altered nutrition related lab


                                                 values related to


       Etiology                                  electrolyte imbalance aeb


       Signs/Symptoms:                           Na 134


      1. Problem


       Problem                                   Underweight related to


       Etiology                                  possible poor intake prior to


                                                 admission aeb


       Signs/Symptoms:                           88% IBW


     Intervention/Recommendation


      Comments                                   1. Continue mechanical soft,


                                                 chopped, no added sodium, high


                                                 protein diet as tolerated by


                                                 patient.


                                                 2. Provide Ensure Enlive BID


                                                 to better meet nutrient needs


                                                 due to wt.


                                                 3. If patient remains


                                                 hyponatremic, consider fluid


                                                 restriction.


     Expected Outcomes/Goals


      Expected Outcomes/Goals                    Adequate nutrition to meet >75


                                                 % estimated needs, improved


                                                 labs,  skin remains intact,


                                                 weight maintenance or trend


                                                 toward ideal body weight.


                                                 F/U MR -

## 2019-07-19 RX ADMIN — IPRATROPIUM BROMIDE SCH MG: 0.5 SOLUTION RESPIRATORY (INHALATION) at 07:06

## 2019-07-19 RX ADMIN — PANTOPRAZOLE SODIUM SCH MG: 40 TABLET, DELAYED RELEASE ORAL at 08:31

## 2019-07-19 RX ADMIN — ALUMINUM HYDROXIDE, MAGNESIUM HYDROXIDE, AND SIMETHICONE PRN ML: 200; 200; 20 SUSPENSION ORAL at 23:22

## 2019-07-19 RX ADMIN — IPRATROPIUM BROMIDE SCH MG: 0.5 SOLUTION RESPIRATORY (INHALATION) at 13:25

## 2019-07-19 RX ADMIN — IPRATROPIUM BROMIDE SCH MG: 0.5 SOLUTION RESPIRATORY (INHALATION) at 18:38

## 2019-07-19 NOTE — INTERNAL MEDICINE PROG NOTE
Internal Medicine Subjective





- Subjective


Service Date: 19


Patient seen and examined:: with staff (SHE IS DOING WELL)


Patient is:: awake, verbal, in bed, confused


Per staff patient has:: no adverse event





Internal Medicine Objective





- Results


Result Diagrams: 


 19 01:07





 19 01:07


Recent Labs: 


 Laboratory Last Values











WBC  7.4 Th/cmm (4.8-10.8)   19  01:07    


 


RBC  4.18 Mil/cmm (3.80-5.80)   19  01:07    


 


Hgb  12.2 gm/dL (12-16)   19  01:07    


 


Hct  36.3 % (41.0-60)  L  19  01:07    


 


MCV  87.0 fl (80-99)   19  01:07    


 


MCH  29.3 pg (27.0-31.0)   19  01:07    


 


MCHC Differential  33.7 pg (28.0-36.0)   19  01:07    


 


RDW  14.5 % (11.5-20.0)   19  01:07    


 


Plt Count  284 Th/cmm (150-400)   19  01:07    


 


MPV  7.2 fl  19  01:07    


 


Neutrophils %  45.8 % (40.0-80.0)   19  01:07    


 


Lymphocytes %  38.3 % (20.0-50.0)   19  01:07    


 


Monocytes %  11.1 % (2.0-10.0)  H  19  01:07    


 


Eosinophils %  4.0 % (0.0-5.0)   19  01:07    


 


Basophils %  0.8 % (0.0-2.0)   19  01:07    


 


Sodium  134 mEq/L (136-145)  L  19  01:07    


 


Potassium  4.1 mEq/L (3.5-5.1)   19  01:07    


 


Chloride  101 mEq/L ()   19  01:07    


 


Carbon Dioxide  25.8 mEq/L (21.0-31.0)   19  01:07    


 


Anion Gap  11.3  (7.0-16.0)   19  01:07    


 


BUN  25 mg/dL (7-25)   19  01:07    


 


Creatinine  1.0 mg/dL (0.7-1.3)   19  01:07    


 


Est GFR ( Amer)  > 60.0 ml/min (>90)   19  01:07    


 


Est GFR (Non-Af Amer)  > 60.0 ml/min  19  01:07    


 


BUN/Creatinine Ratio  25.0   19  01:07    


 


Glucose  99 mg/dL ()   19  01:07    


 


Calcium  9.3 mg/dL (8.6-10.3)   19  01:07    


 


Total Bilirubin  0.4 mg/dL (0.3-1.0)   19  01:07    


 


AST  15 U/L (13-39)   19  01:07    


 


ALT  20 U/L (7-52)   19  01:07    


 


Alkaline Phosphatase  90 U/L ()   19  01:07    


 


Total Protein  6.4 gm/dL (6.0-8.3)   19  01:07    


 


Albumin  3.8 gm/dL (4.2-5.5)  L  19  01:07    


 


Globulin  2.6 gm/dL  19  01:07    


 


Albumin/Globulin Ratio  1.5  (1.0-1.8)   19  01:07    


 


Triglycerides  73 mg/dL (<150)   19  01:07    


 


Cholesterol  156 mg/dL (<200)   19  01:07    


 


LDL Cholesterol Direct  101 mg/dL ()   19  01:07    


 


HDL Cholesterol  48 mg/dL (23-92)   19  01:07    


 


TSH  2.65 uIU/ml (0.34-5.60)   19  01:07    


 


Urine Source  CLEAN C   19  02:21    


 


Urine Color  YELLOW   19  02:21    


 


Urine Clarity  CLEAR  (CLEAR)   19  02:21    


 


Urine pH  5.5  (4.6 - 8.0)   19  02:21    


 


Ur Specific Gravity  1.020  (1.005-1.030)   19  02:21    


 


Urine Protein  NEGATIVE mg/dL (NEGATIVE)   19  02:21    


 


Urine Glucose (UA)  NEGATIVE mg/dL (NEGATIVE)   19  02:21    


 


Urine Ketones  NEGATIVE mg/dL (NEGATIVE)   19  02:21    


 


Urine Blood  TRACE  (NEGATIVE)   19  02:21    


 


Urine Nitrate  NEGATIVE  (NEGATIVE)   19  02:21    


 


Urine Bilirubin  NEGATIVE  (NEGATIVE)   19  02:21    


 


Urine Urobilinogen  0.2 E.U./dL (0.2 - 1.0)   19  02:21    


 


Ur Leukocyte Esterase  NEGATIVE  (NEGATIVE)   19  02:21    


 


Urine RBC  2-5 /hpf (0-5)  H  19  02:21    


 


Urine WBC  0-2 /hpf (0-5)   19  02:21    


 


Ur Epithelial Cells  NONE SEEN /lpf (FEW)   19  02:21    


 


Urine Bacteria  FEW /hpf (NONE SEEN)   19  02:21    


 


RPR  NONREACTIVE  (NONREACTIVE)   19  01:07    














- Physical Exam


Vitals and I&O: 


 Vital Signs











Temp  97.0 F   19 14:00


 


Pulse  87   19 18:38


 


Resp  18   19 18:38


 


BP  98/57   19 14:00


 


Pulse Ox  98   19 18:38








 Intake & Output











 19





 06:59 18:59 06:59


 


Intake Total 240 1200 


 


Balance 240 1200 


 


Intake:   


 


  Oral 240 1200 


 


Other:   


 


  # Voids 2  


 


  # Bowel Movements 0 1 











Active Medications: 


Current Medications





Acetaminophen (Tylenol)  650 mg PO Q4HR PRN


   PRN Reason: Pain or Fever >101


   Stop: 19 20:28


Al Hydrox/Mg Hydrox/Simethicone (Maalox)  30 ml PO Q4HR PRN


   PRN Reason: GI DISTRESS


   Stop: 19 02:31


   Last Admin: 07/15/19 02:10 Dose:  30 ml


Benztropine Mesylate (Cogentin)  1 mg PO DAILY [facility]


   Stop: 19 08:59


   Last Admin: 19 08:31 Dose:  1 mg


Docusate Sodium (Colace)  250 mg PO DAILY [facility]


   Stop: 19 08:59


   Last Admin: 19 08:30 Dose:  250 mg


Folic Acid (Folate)  0.5 mg PO DAILY [facility]


   Stop: 19 08:59


   Last Admin: 19 08:30 Dose:  0.5 mg


Haloperidol Decanoate (Haldol Dec)  100 mg IM QMONTH [facility]


   Stop: 10/04/19 08:59


Ipratropium Bromide (Atrovent Neb 0.5mg/2.5ml)  0.5 mg HHN Q6HRT [facility]


   Stop: 19 12:59


   Last Admin: 19 18:38 Dose:  0.5 mg


Lorazepam (Ativan)  0.5 mg PO Q4HR PRN; Protocol


   PRN Reason: Agitation


   Stop: 19 02:31


   Last Admin: 19 09:26 Dose:  0.5 mg


Magnesium Hydroxide (Milk Of Magnesia)  30 ml PO HS PRN


   PRN Reason: Constipation


Pantoprazole Sodium (Protonix)  40 mg PO DAILY [facility]


   Stop: 19 06:29


   Last Admin: 19 08:31 Dose:  40 mg


Quetiapine Fumarate (Seroquel)  12.5 mg PO DAILY [facility]; Protocol


   Stop: 19 08:59


   Last Admin: 19 08:30 Dose:  12.5 mg


Tamsulosin HCl (Flomax)  0.4 mg PO DAILY [facility]


   Stop: 19 08:59


   Last Admin: 19 08:31 Dose:  0.4 mg


Zolpidem Tartrate (Ambien)  5 mg PO HSMR1 PRN


   PRN Reason: Insomnia


   Stop: 19 02:31








General: demented


HEENT: NC/AT, PERRLA, EOMI, anicteric sclerae, throat clear


Neck: Supple, No JVD, No thyromegaly, +2 carotid pulse wo bruit, No LAD


Lungs: CTAB


Cardiovascular: RRR, Normal S1, Normal S2, without murmur


Abdomen: soft, non-tender, non-distended


Extremities: clear


Neurological: no change





Internal Medicine Assmt/Plan





- Assessment


Assessment: 





1.BPH


2.DJD.


3.CHRONIC CONSTIPATION.


4.PSYCHOSIS





- Plan


Plan: 





CONTINUE ON CURRENT5 MEDICATION AND DIET.





Nutritional Asmnt/Malnutr-PDOC





- Dietary Evaluation


Malnutrition Findings (Please click <Entered> for more info): 








Nutritional Asmnt/Malnutrition                             Start:  19 08:

31


Text:                                                      Status: Complete    

  


Freq:                                                                          

  


Protocol:                                                                      

  


 Document     19 08:33  [id number]  (Rec: 19 08:47  MMULDEBBY SALOMON-

FNS1)


 Nutritional Asmnt/Malnutrition


     Patient General Information


      Nutritional Screening                      High Risk


                                                 Consult


      Diagnosis                                  Schizophrenia, bipolar


      Pertinent Medical Hx/Surgical Hx           SCHIZOPHRENIA BIPOLAR UTI BPH


                                                 EPILEPSY ANEMIA ANOREXIA


      Subjective Information                     Patient was admitted from SNF.


                                                 Missing broken teeth. Patient


                                                 in activity room at time of


                                                 visit. Tolerating current diet


                                                 order.


      Current Diet Order/ Nutrition Support      Mechanical soft, chopped, no


                                                 added sodium, high protein


      Patient / S.O                              Not Indicated


      Pertinent Medications                      maalox, colace, folate, MOM,


                                                 Protonix


      Pertinent Labs                             () Na 134, Albumin 3.8


     Nutritional Hx/Data


      Height                                     1.7 m


      Height (Calculated Centimeters)            170.2


      Current Weight (lbs)                       59.421 kg


      Weight (Calculated Kilograms)              59.4


      Weight (Calculated Grams)                  00197.6


      Ideal Body Weight                          148


      % Ideal Body Weight                        88


      Body Mass Index (BMI)                      20.5


      Recent Weight Change                       No


      Weight Status                              Approriate


     GI Symptoms


      GI Symptoms                                None


      Last BM                                    none noted since admission


      Difficult in:                              None


      Food Allergies                             No


      Cultural/Ethnic/[employer] Belief           none indicated


      Usual diet at home                         Soft ROBINSON large portion, 4oz


                                                 HPN BID


      Skin Integrity/Comment:                    Intact, Sharif 19


      Current %PO                                Good (%)


     Estimated Nutritional Goals


      BEE in Kcals:                              Adj wt of IBW


      Calories/Kcals/Kg                          IBW 67.2kg 25-30 kcal/kg


      Kcals Calculated                           ~3820-8952 kcal/day


      Protein:                                   Adj wt of IBW


      Protein g/k-1.2 gm/kg


      Protein Calculated                         ~65-80 gm/day


      Fluid: ml                                  ~5930-9604 ml/day (1 ml/kcal)


     Nutritional Problem


      2. Problem


       Problem                                   Altered nutrition related lab


                                                 values related to


       Etiology                                  electrolyte imbalance aeb


       Signs/Symptoms:                           Na 134


      1. Problem


       Problem                                   Underweight related to


       Etiology                                  possible poor intake prior to


                                                 admission aeb


       Signs/Symptoms:                           88% IBW


     Intervention/Recommendation


      Comments                                   1. Continue mechanical soft,


                                                 chopped, no added sodium, high


                                                 protein diet as tolerated by


                                                 patient.


                                                 2. Provide Ensure Enlive BID


                                                 to better meet nutrient needs


                                                 due to wt.


                                                 3. If patient remains


                                                 hyponatremic, consider fluid


                                                 restriction.


     Expected Outcomes/Goals


      Expected Outcomes/Goals                    Adequate nutrition to meet >75


                                                 % estimated needs, improved


                                                 labs,  skin remains intact,


                                                 weight maintenance or trend


                                                 toward ideal body weight.


                                                 F/U MR -

## 2019-07-19 NOTE — PROGRESS NOTES
DATE:  



SUBJECTIVE:  Chart was reviewed and the patient interviewed.  Also discussed the

patient's condition with the staff and reviewed records and labs.  The patient

seems to be slightly less agitated and seems to be slightly calmer, but he is

still suspicious and paranoid and having episodes of severe anger.  The patient

needs lots of redirections.  Also, is still in angry mood and still have periods

of anger.  Otherwise, the patient continued to comply with taking medications

with no side effects of medications.



ASSESSMENT:  The patient is still agitated and irritable.



TREATMENT PLAN:  Continue to monitor behavior and condition closely.  Also,

continue adjusting psychotropic medications.





DD: 07/18/2019 22:40

DT: 07/19/2019 00:32

JOB# [id number]  [id number]

## 2019-07-20 RX ADMIN — IPRATROPIUM BROMIDE SCH MG: 0.5 SOLUTION RESPIRATORY (INHALATION) at 13:32

## 2019-07-20 RX ADMIN — IPRATROPIUM BROMIDE SCH MG: 0.5 SOLUTION RESPIRATORY (INHALATION) at 07:05

## 2019-07-20 RX ADMIN — IPRATROPIUM BROMIDE SCH MG: 0.5 SOLUTION RESPIRATORY (INHALATION) at 00:58

## 2019-07-20 RX ADMIN — PANTOPRAZOLE SODIUM SCH MG: 40 TABLET, DELAYED RELEASE ORAL at 08:48

## 2019-07-20 NOTE — GENERAL PROGRESS NOTE
Subjective





- Review of Systems


Service Date: 19


Subjective: 





resting comfortably


no distress





Objective





- Results


Result Diagrams: 


 19 01:07





 19 01:07


Recent Labs: 


 Laboratory Last Values











WBC  7.4 Th/cmm (4.8-10.8)   19  01:07    


 


RBC  4.18 Mil/cmm (3.80-5.80)   19  01:07    


 


Hgb  12.2 gm/dL (12-16)   19  01:07    


 


Hct  36.3 % (41.0-60)  L  19  01:07    


 


MCV  87.0 fl (80-99)   19  01:07    


 


MCH  29.3 pg (27.0-31.0)   19  01:07    


 


MCHC Differential  33.7 pg (28.0-36.0)   19  01:07    


 


RDW  14.5 % (11.5-20.0)   19  01:07    


 


Plt Count  284 Th/cmm (150-400)   19  01:07    


 


MPV  7.2 fl  19  01:07    


 


Neutrophils %  45.8 % (40.0-80.0)   19  01:07    


 


Lymphocytes %  38.3 % (20.0-50.0)   19  01:07    


 


Monocytes %  11.1 % (2.0-10.0)  H  19  01:07    


 


Eosinophils %  4.0 % (0.0-5.0)   19  01:07    


 


Basophils %  0.8 % (0.0-2.0)   19  01:07    


 


Sodium  134 mEq/L (136-145)  L  19  01:07    


 


Potassium  4.1 mEq/L (3.5-5.1)   19  01:07    


 


Chloride  101 mEq/L ()   19  01:07    


 


Carbon Dioxide  25.8 mEq/L (21.0-31.0)   19  01:07    


 


Anion Gap  11.3  (7.0-16.0)   19  01:07    


 


BUN  25 mg/dL (7-25)   19  01:07    


 


Creatinine  1.0 mg/dL (0.7-1.3)   19  01:07    


 


Est GFR ( Amer)  > 60.0 ml/min (>90)   19  01:07    


 


Est GFR (Non-Af Amer)  > 60.0 ml/min  19  01:07    


 


BUN/Creatinine Ratio  25.0   19  01:07    


 


Glucose  99 mg/dL ()   19  01:07    


 


Calcium  9.3 mg/dL (8.6-10.3)   19  01:07    


 


Total Bilirubin  0.4 mg/dL (0.3-1.0)   19  01:07    


 


AST  15 U/L (13-39)   19  01:07    


 


ALT  20 U/L (7-52)   19  01:07    


 


Alkaline Phosphatase  90 U/L ()   19  01:07    


 


Total Protein  6.4 gm/dL (6.0-8.3)   19  01:07    


 


Albumin  3.8 gm/dL (4.2-5.5)  L  19  01:07    


 


Globulin  2.6 gm/dL  19  01:07    


 


Albumin/Globulin Ratio  1.5  (1.0-1.8)   19  01:07    


 


Triglycerides  73 mg/dL (<150)   19  01:07    


 


Cholesterol  156 mg/dL (<200)   19  01:07    


 


LDL Cholesterol Direct  101 mg/dL ()   19  01:07    


 


HDL Cholesterol  48 mg/dL (23-92)   19  01:07    


 


TSH  2.65 uIU/ml (0.34-5.60)   19  01:07    


 


Urine Source  CLEAN C   19  02:21    


 


Urine Color  YELLOW   19  02:21    


 


Urine Clarity  CLEAR  (CLEAR)   19  02:21    


 


Urine pH  5.5  (4.6 - 8.0)   19  02:21    


 


Ur Specific Gravity  1.020  (1.005-1.030)   19  02:21    


 


Urine Protein  NEGATIVE mg/dL (NEGATIVE)   19  02:21    


 


Urine Glucose (UA)  NEGATIVE mg/dL (NEGATIVE)   19  02:21    


 


Urine Ketones  NEGATIVE mg/dL (NEGATIVE)   19  02:21    


 


Urine Blood  TRACE  (NEGATIVE)   19  02:21    


 


Urine Nitrate  NEGATIVE  (NEGATIVE)   19  02:21    


 


Urine Bilirubin  NEGATIVE  (NEGATIVE)   19  02:21    


 


Urine Urobilinogen  0.2 E.U./dL (0.2 - 1.0)   19  02:21    


 


Ur Leukocyte Esterase  NEGATIVE  (NEGATIVE)   19  02:21    


 


Urine RBC  2-5 /hpf (0-5)  H  19  02:21    


 


Urine WBC  0-2 /hpf (0-5)   19  02:21    


 


Ur Epithelial Cells  NONE SEEN /lpf (FEW)   19  02:21    


 


Urine Bacteria  FEW /hpf (NONE SEEN)   19  02:21    


 


RPR  NONREACTIVE  (NONREACTIVE)   19  01:07    














- Physical Exam


Vitals and I&O: 


 Vital Signs











Temp  97.3 F   19 05:38


 


Pulse  84   19 13:32


 


Resp  18   19 13:32


 


BP  94/54   19 05:38


 


Pulse Ox  100   19 13:32








 Intake & Output











 19





 18:59 06:59 18:59


 


Intake Total 1200 240 


 


Balance 1200 240 


 


Intake:   


 


  Oral 1200 240 


 


Other:   


 


  # Voids  2 


 


  # Bowel Movements 1  











Active Medications: 


Current Medications





Acetaminophen (Tylenol)  650 mg PO Q4HR PRN


   PRN Reason: Pain or Fever >101


   Stop: 19 20:28


Al Hydrox/Mg Hydrox/Simethicone (Maalox)  30 ml PO Q4HR PRN


   PRN Reason: GI DISTRESS


   Stop: 19 02:31


   Last Admin: 19 23:22 Dose:  30 ml


Benztropine Mesylate (Cogentin)  1 mg PO DAILY [facility]


   Stop: 19 08:59


   Last Admin: 19 08:47 Dose:  1 mg


Docusate Sodium (Colace)  250 mg PO DAILY [facility]


   Stop: 19 08:59


   Last Admin: 19 08:47 Dose:  250 mg


Folic Acid (Folate)  0.5 mg PO DAILY [facility]


   Stop: 19 08:59


   Last Admin: 19 08:47 Dose:  0.5 mg


Haloperidol Decanoate (Haldol Dec)  100 mg IM QMONTH [facility]


   Stop: 10/04/19 08:59


Ipratropium Bromide (Atrovent Neb 0.5mg/2.5ml)  0.5 mg HHN Q6HRT PRN


   PRN Reason: Shortness of Breath


   Stop: 19 12:59


Lorazepam (Ativan)  0.5 mg PO Q4HR PRN; Protocol


   PRN Reason: Agitation


   Stop: 19 02:31


   Last Admin: 19 09:26 Dose:  0.5 mg


Magnesium Hydroxide (Milk Of Magnesia)  30 ml PO HS PRN


   PRN Reason: Constipation


Pantoprazole Sodium (Protonix)  40 mg PO DAILY [facility]


   Stop: 19 06:29


   Last Admin: 19 08:48 Dose:  40 mg


Quetiapine Fumarate (Seroquel)  12.5 mg PO DAILY [facility]; Protocol


   Stop: 19 08:59


   Last Admin: 19 08:47 Dose:  12.5 mg


Tamsulosin HCl (Flomax)  0.4 mg PO DAILY [facility]


   Stop: 19 08:59


   Last Admin: 19 08:48 Dose:  0.4 mg


Zolpidem Tartrate (Ambien)  5 mg PO HSMR1 PRN


   PRN Reason: Insomnia


   Stop: 19 02:31








General: No acute distress


HEENT: PERRLA


Neck: Supple, JVD


Cardiovascular: Regular rate, Normal S1, Normal S2


Lungs: Clear to auscultation


Abdomen: Bowel sounds, Soft





Assessment/Plan





- Assessment


Assessment: 





1.BPH


2.DJD.


3.CHRONIC CONSTIPATION.


4.PSYCHOSIS





- Plan


Plan: 





continue current treatment





Nutritional Asmnt/Malnutr-PDOC





- Dietary Evaluation


Malnutrition Findings (Please click <Entered> for more info): 








Nutritional Asmnt/Malnutrition                             Start:  19 08:

31


Text:                                                      Status: Complete    

  


Freq:                                                                          

  


Protocol:                                                                      

  


 Document     19 08:33  MMWEN  (Rec: 19 08:47  MMULDEBBY SALOMON-

FNS1)


 Nutritional Asmnt/Malnutrition


     Patient General Information


      Nutritional Screening                      High Risk


                                                 Consult


      Diagnosis                                  Schizophrenia, bipolar


      Pertinent Medical Hx/Surgical Hx           SCHIZOPHRENIA BIPOLAR UTI BPH


                                                 EPILEPSY ANEMIA ANOREXIA


      Subjective Information                     Patient was admitted from SNF.


                                                 Missing broken teeth. Patient


                                                 in activity room at time of


                                                 visit. Tolerating current diet


                                                 order.


      Current Diet Order/ Nutrition Support      Mechanical soft, chopped, no


                                                 added sodium, high protein


      Patient / S.O                              Not Indicated


      Pertinent Medications                      maalox, colace, folate, MOM,


                                                 Protonix


      Pertinent Labs                             () Na 134, Albumin 3.8


     Nutritional Hx/Data


      Height                                     1.7 m


      Height (Calculated Centimeters)            170.2


      Current Weight (lbs)                       59.421 kg


      Weight (Calculated Kilograms)              59.4


      Weight (Calculated Grams)                  80671.6


      Ideal Body Weight                          148


      % Ideal Body Weight                        88


      Body Mass Index (BMI)                      20.5


      Recent Weight Change                       No


      Weight Status                              Approriate


     GI Symptoms


      GI Symptoms                                None


      Last BM                                    none noted since admission


      Difficult in:                              None


      Food Allergies                             No


      Cultural/Ethnic/[employer] Belief           none indicated


      Usual diet at home                         Soft ROBINSON large portion, 4oz


                                                 HPN BID


      Skin Integrity/Comment:                    Intact, Sharif 19


      Current %PO                                Good (%)


     Estimated Nutritional Goals


      BEE in Kcals:                              Adj wt of IBW


      Calories/Kcals/Kg                          IBW 67.2kg 25-30 kcal/kg


      Kcals Calculated                           ~6977-6045 kcal/day


      Protein:                                   Adj wt of IBW


      Protein g/k-1.2 gm/kg


      Protein Calculated                         ~65-80 gm/day


      Fluid: ml                                  ~0375-4000 ml/day (1 ml/kcal)


     Nutritional Problem


      2. Problem


       Problem                                   Altered nutrition related lab


                                                 values related to


       Etiology                                  electrolyte imbalance aeb


       Signs/Symptoms:                           Na 134


      1. Problem


       Problem                                   Underweight related to


       Etiology                                  possible poor intake prior to


                                                 admission aeb


       Signs/Symptoms:                           88% IBW


     Intervention/Recommendation


      Comments                                   1. Continue mechanical soft,


                                                 chopped, no added sodium, high


                                                 protein diet as tolerated by


                                                 patient.


                                                 2. Provide Ensure Enlive BID


                                                 to better meet nutrient needs


                                                 due to wt.


                                                 3. If patient remains


                                                 hyponatremic, consider fluid


                                                 restriction.


     Expected Outcomes/Goals


      Expected Outcomes/Goals                    Adequate nutrition to meet >75


                                                 % estimated needs, improved


                                                 labs,  skin remains intact,


                                                 weight maintenance or trend


                                                 toward ideal body weight.


                                                 F/U MR -

## 2019-07-20 NOTE — PROGRESS NOTES
DATE:  07/19/2019



Covering for Dr. Merino.



Case was discussed with staff of the patient and reviewed records.  This is an

69-year-old male who was admitted on 07/13/2019 because of agitation,

irritability, unable to follow direction.  The patient came from [place]

where he has been living.  He was causing disturbances, intrusive, going into

other patient's rooms and other residents, resisting care with a history of

multiple psychiatric hospitalizations for treatment of psychosis and dementia. 

The patient has GERD and COPD.  THE PATIENT IS ALLERGIC TO CLOZAPINE.  The

patient is reported to be a little bit less agitated, somewhat calmer, but still

paranoid, suspicious, with episodes of severe anger, needing redirection.  He

has been compliant with the medication with no side effects, no sedation, no

nausea, no extrapyramidal symptoms.  He gets Haldol Decanoate once a month 100

mg, Cogentin 1 mg daily and Seroquel 12.5 mg daily with no side effects, no

sedation, no nausea, no extrapyramidal symptoms.  We will continue the patient

in group therapy, milieu therapy, adjust medication as needed.





DD: 07/19/2019 13:04

DT: 07/20/2019 02:10

JOB# [id number]  [id number]

## 2019-07-21 RX ADMIN — ALUMINUM HYDROXIDE, MAGNESIUM HYDROXIDE, AND SIMETHICONE PRN ML: 200; 200; 20 SUSPENSION ORAL at 23:24

## 2019-07-21 RX ADMIN — PANTOPRAZOLE SODIUM SCH MG: 40 TABLET, DELAYED RELEASE ORAL at 09:25

## 2019-07-21 NOTE — GENERAL PROGRESS NOTE
Subjective





- Review of Systems


Service Date: 19


Subjective: 





resting comfortably


no distress





Objective





- Results


Result Diagrams: 


 19 01:07





 19 01:07


Recent Labs: 


 Laboratory Last Values











WBC  7.4 Th/cmm (4.8-10.8)   19  01:07    


 


RBC  4.18 Mil/cmm (3.80-5.80)   19  01:07    


 


Hgb  12.2 gm/dL (12-16)   19  01:07    


 


Hct  36.3 % (41.0-60)  L  19  01:07    


 


MCV  87.0 fl (80-99)   19  01:07    


 


MCH  29.3 pg (27.0-31.0)   19  01:07    


 


MCHC Differential  33.7 pg (28.0-36.0)   19  01:07    


 


RDW  14.5 % (11.5-20.0)   19  01:07    


 


Plt Count  284 Th/cmm (150-400)   19  01:07    


 


MPV  7.2 fl  19  01:07    


 


Neutrophils %  45.8 % (40.0-80.0)   19  01:07    


 


Lymphocytes %  38.3 % (20.0-50.0)   19  01:07    


 


Monocytes %  11.1 % (2.0-10.0)  H  19  01:07    


 


Eosinophils %  4.0 % (0.0-5.0)   19  01:07    


 


Basophils %  0.8 % (0.0-2.0)   19  01:07    


 


Sodium  134 mEq/L (136-145)  L  19  01:07    


 


Potassium  4.1 mEq/L (3.5-5.1)   19  01:07    


 


Chloride  101 mEq/L ()   19  01:07    


 


Carbon Dioxide  25.8 mEq/L (21.0-31.0)   19  01:07    


 


Anion Gap  11.3  (7.0-16.0)   19  01:07    


 


BUN  25 mg/dL (7-25)   19  01:07    


 


Creatinine  1.0 mg/dL (0.7-1.3)   19  01:07    


 


Est GFR ( Amer)  > 60.0 ml/min (>90)   19  01:07    


 


Est GFR (Non-Af Amer)  > 60.0 ml/min  19  01:07    


 


BUN/Creatinine Ratio  25.0   19  01:07    


 


Glucose  99 mg/dL ()   19  01:07    


 


Calcium  9.3 mg/dL (8.6-10.3)   19  01:07    


 


Total Bilirubin  0.4 mg/dL (0.3-1.0)   19  01:07    


 


AST  15 U/L (13-39)   19  01:07    


 


ALT  20 U/L (7-52)   19  01:07    


 


Alkaline Phosphatase  90 U/L ()   19  01:07    


 


Total Protein  6.4 gm/dL (6.0-8.3)   19  01:07    


 


Albumin  3.8 gm/dL (4.2-5.5)  L  19  01:07    


 


Globulin  2.6 gm/dL  19  01:07    


 


Albumin/Globulin Ratio  1.5  (1.0-1.8)   19  01:07    


 


Triglycerides  73 mg/dL (<150)   19  01:07    


 


Cholesterol  156 mg/dL (<200)   19  01:07    


 


LDL Cholesterol Direct  101 mg/dL ()   19  01:07    


 


HDL Cholesterol  48 mg/dL (23-92)   19  01:07    


 


TSH  2.65 uIU/ml (0.34-5.60)   19  01:07    


 


Urine Source  CLEAN C   19  02:21    


 


Urine Color  YELLOW   19  02:21    


 


Urine Clarity  CLEAR  (CLEAR)   19  02:21    


 


Urine pH  5.5  (4.6 - 8.0)   19  02:21    


 


Ur Specific Gravity  1.020  (1.005-1.030)   19  02:21    


 


Urine Protein  NEGATIVE mg/dL (NEGATIVE)   19  02:21    


 


Urine Glucose (UA)  NEGATIVE mg/dL (NEGATIVE)   19  02:21    


 


Urine Ketones  NEGATIVE mg/dL (NEGATIVE)   19  02:21    


 


Urine Blood  TRACE  (NEGATIVE)   19  02:21    


 


Urine Nitrate  NEGATIVE  (NEGATIVE)   19  02:21    


 


Urine Bilirubin  NEGATIVE  (NEGATIVE)   19  02:21    


 


Urine Urobilinogen  0.2 E.U./dL (0.2 - 1.0)   19  02:21    


 


Ur Leukocyte Esterase  NEGATIVE  (NEGATIVE)   19  02:21    


 


Urine RBC  2-5 /hpf (0-5)  H  19  02:21    


 


Urine WBC  0-2 /hpf (0-5)   19  02:21    


 


Ur Epithelial Cells  NONE SEEN /lpf (FEW)   19  02:21    


 


Urine Bacteria  FEW /hpf (NONE SEEN)   19  02:21    


 


RPR  NONREACTIVE  (NONREACTIVE)   19  01:07    














- Physical Exam


Vitals and I&O: 


 Vital Signs











Temp  96.4 F   19 06:17


 


Pulse  87   19 08:20


 


Resp  18   19 08:20


 


BP  111/61   19 06:17


 


Pulse Ox  99   19 08:20








 Intake & Output











 19





 18:59 06:59 18:59


 


Intake Total 1000 960 


 


Balance 1000 960 


 


Intake:   


 


  Oral 1000 960 


 


Other:   


 


  # Voids 4 2 


 


  # Bowel Movements 1 1 











Active Medications: 


Current Medications





Acetaminophen (Tylenol)  650 mg PO Q4HR PRN


   PRN Reason: Pain or Fever >101


   Stop: 19 20:28


Al Hydrox/Mg Hydrox/Simethicone (Maalox)  30 ml PO Q4HR PRN


   PRN Reason: GI DISTRESS


   Stop: 19 02:31


   Last Admin: 19 23:22 Dose:  30 ml


Benztropine Mesylate (Cogentin)  1 mg PO DAILY [facility]


   Stop: 19 08:59


   Last Admin: 19 09:26 Dose:  1 mg


Docusate Sodium (Colace)  250 mg PO DAILY [facility]


   Stop: 19 08:59


   Last Admin: 19 09:25 Dose:  250 mg


Folic Acid (Folate)  0.5 mg PO DAILY [facility]


   Stop: 19 08:59


   Last Admin: 19 09:26 Dose:  0.5 mg


Haloperidol Decanoate (Haldol Dec)  100 mg IM QMONTH [facility]


   Stop: 10/04/19 08:59


Ipratropium Bromide (Atrovent Neb 0.5mg/2.5ml)  0.5 mg HHN Q6HRT PRN


   PRN Reason: Shortness of Breath


   Stop: 19 12:59


Lorazepam (Ativan)  0.5 mg PO Q4HR PRN; Protocol


   PRN Reason: Agitation


   Stop: 19 02:31


   Last Admin: 19 09:26 Dose:  0.5 mg


Magnesium Hydroxide (Milk Of Magnesia)  30 ml PO HS PRN


   PRN Reason: Constipation


Pantoprazole Sodium (Protonix)  40 mg PO DAILY [facility]


   Stop: 19 06:29


   Last Admin: 19 09:25 Dose:  40 mg


Quetiapine Fumarate (Seroquel)  12.5 mg PO DAILY [facility]; Protocol


   Stop: 19 08:59


   Last Admin: 19 09:25 Dose:  12.5 mg


Tamsulosin HCl (Flomax)  0.4 mg PO DAILY [facility]


   Stop: 19 08:59


   Last Admin: 19 09:26 Dose:  0.4 mg


Zolpidem Tartrate (Ambien)  5 mg PO HSMR1 PRN


   PRN Reason: Insomnia


   Stop: 19 02:31








General: No acute distress


HEENT: PERRLA


Neck: Supple, JVD


Cardiovascular: Regular rate, Normal S1, Normal S2


Lungs: Clear to auscultation


Abdomen: Bowel sounds, Soft





Assessment/Plan





- Assessment


Assessment: 





1.BPH


2.DJD.


3.CHRONIC CONSTIPATION.


4.PSYCHOSIS





- Plan


Plan: 





continue current treatment





Nutritional Asmnt/Malnutr-PDOC





- Dietary Evaluation


Malnutrition Findings (Please click <Entered> for more info): 








Nutritional Asmnt/Malnutrition                             Start:  19 08:

31


Text:                                                      Status: Complete    

  


Freq:                                                                          

  


Protocol:                                                                      

  


 Document     19 08:33  MMWEN  (Rec: 19 08:47  CATE SALOMON-

FNS1)


 Nutritional Asmnt/Malnutrition


     Patient General Information


      Nutritional Screening                      High Risk


                                                 Consult


      Diagnosis                                  Schizophrenia, bipolar


      Pertinent Medical Hx/Surgical Hx           SCHIZOPHRENIA BIPOLAR UTI BPH


                                                 EPILEPSY ANEMIA ANOREXIA


      Subjective Information                     Patient was admitted from SNF.


                                                 Missing broken teeth. Patient


                                                 in activity room at time of


                                                 visit. Tolerating current diet


                                                 order.


      Current Diet Order/ Nutrition Support      Mechanical soft, chopped, no


                                                 added sodium, high protein


      Patient / S.O                              Not Indicated


      Pertinent Medications                      maalox, colace, folate, MOM,


                                                 Protonix


      Pertinent Labs                             () Na 134, Albumin 3.8


     Nutritional Hx/Data


      Height                                     1.7 m


      Height (Calculated Centimeters)            170.2


      Current Weight (lbs)                       59.421 kg


      Weight (Calculated Kilograms)              59.4


      Weight (Calculated Grams)                  50073.6


      Ideal Body Weight                          148


      % Ideal Body Weight                        88


      Body Mass Index (BMI)                      20.5


      Recent Weight Change                       No


      Weight Status                              Approriate


     GI Symptoms


      GI Symptoms                                None


      Last BM                                    none noted since admission


      Difficult in:                              None


      Food Allergies                             No


      Cultural/Ethnic/Temple Belief           none indicated


      Usual diet at home                         Soft ROBINSON large portion, 4oz


                                                 HPN BID


      Skin Integrity/Comment:                    Intact, Sharif 19


      Current %PO                                Good (%)


     Estimated Nutritional Goals


      BEE in Kcals:                              Adj wt of IBW


      Calories/Kcals/Kg                          IBW 67.2kg 25-30 kcal/kg


      Kcals Calculated                           ~7555-8307 kcal/day


      Protein:                                   Adj wt of IBW


      Protein g/k-1.2 gm/kg


      Protein Calculated                         ~65-80 gm/day


      Fluid: ml                                  ~3125-3560 ml/day (1 ml/kcal)


     Nutritional Problem


      2. Problem


       Problem                                   Altered nutrition related lab


                                                 values related to


       Etiology                                  electrolyte imbalance aeb


       Signs/Symptoms:                           Na 134


      1. Problem


       Problem                                   Underweight related to


       Etiology                                  possible poor intake prior to


                                                 admission aeb


       Signs/Symptoms:                           88% IBW


     Intervention/Recommendation


      Comments                                   1. Continue mechanical soft,


                                                 chopped, no added sodium, high


                                                 protein diet as tolerated by


                                                 patient.


                                                 2. Provide Ensure Enlive BID


                                                 to better meet nutrient needs


                                                 due to wt.


                                                 3. If patient remains


                                                 hyponatremic, consider fluid


                                                 restriction.


     Expected Outcomes/Goals


      Expected Outcomes/Goals                    Adequate nutrition to meet >75


                                                 % estimated needs, improved


                                                 labs,  skin remains intact,


                                                 weight maintenance or trend


                                                 toward ideal body weight.


                                                 F/U MR -

## 2019-07-22 RX ADMIN — PANTOPRAZOLE SODIUM SCH MG: 40 TABLET, DELAYED RELEASE ORAL at 08:57

## 2019-07-22 NOTE — PROGRESS NOTES
DATE:  07/21/2019



SUBJECTIVE:  Today on face-to-face evaluation, the patient denies any side

effects of the medication.  The patient is in his room, suspicious, perseverates

multiple times "who are you, who are you" despite ____ with him that I am the

psychiatrist.



EXAMINATION:  Suspicious, paranoid, perseverates and ruminates.



ASSESSMENT AND PLAN:  We will continue with the current medication regimen as he

continues to reach steady state.  We will obtain more collateral baseline

information.





DD: 07/21/2019 06:19

DT: 07/21/2019 22:24

[facility]# [id number]  [id number]

## 2019-07-22 NOTE — PROGRESS NOTES
DATE:  07/22/2019



Covering for Dr. Merino.



SUBJECTIVE:  Case was discussed with staff of the patient, reviewed records. 

The patient continues to be somewhat suspicious, continues to be confused,

continues to be paranoid at times, repeating himself, unable to make safe plan

for self-care.  He is unpredictable, impulsive, and whispering when he talks. 

He has been compliant with the medication with no side effects, no sedation, no

nausea, and no extrapyramidal symptoms.  I will be increasing his Seroquel to 25

mg daily.  No side effects, no sedation, no nausea, and no extrapyramidal

symptoms.  We will continue to work with the patient in group therapy, milieu

therapy, and adjust the medication as needed.





DD: 07/22/2019 12:52

DT: 07/22/2019 23:37

JOB# [id number]  [id number]

## 2019-07-22 NOTE — INTERNAL MEDICINE PROG NOTE
Internal Medicine Subjective





- Subjective


Service Date: 19


Patient seen and examined:: with staff (SHE IS DOING WELL)


Patient is:: awake, verbal, in bed, confused


Per staff patient has:: no adverse event





Internal Medicine Objective





- Results


Result Diagrams: 


 19 01:07





 19 01:07


Recent Labs: 


 Laboratory Last Values











WBC  7.4 Th/cmm (4.8-10.8)   19  01:07    


 


RBC  4.18 Mil/cmm (3.80-5.80)   19  01:07    


 


Hgb  12.2 gm/dL (12-16)   19  01:07    


 


Hct  36.3 % (41.0-60)  L  19  01:07    


 


MCV  87.0 fl (80-99)   19  01:07    


 


MCH  29.3 pg (27.0-31.0)   19  01:07    


 


MCHC Differential  33.7 pg (28.0-36.0)   19  01:07    


 


RDW  14.5 % (11.5-20.0)   19  01:07    


 


Plt Count  284 Th/cmm (150-400)   19  01:07    


 


MPV  7.2 fl  19  01:07    


 


Neutrophils %  45.8 % (40.0-80.0)   19  01:07    


 


Lymphocytes %  38.3 % (20.0-50.0)   19  01:07    


 


Monocytes %  11.1 % (2.0-10.0)  H  19  01:07    


 


Eosinophils %  4.0 % (0.0-5.0)   19  01:07    


 


Basophils %  0.8 % (0.0-2.0)   19  01:07    


 


Sodium  134 mEq/L (136-145)  L  19  01:07    


 


Potassium  4.1 mEq/L (3.5-5.1)   19  01:07    


 


Chloride  101 mEq/L ()   19  01:07    


 


Carbon Dioxide  25.8 mEq/L (21.0-31.0)   19  01:07    


 


Anion Gap  11.3  (7.0-16.0)   19  01:07    


 


BUN  25 mg/dL (7-25)   19  01:07    


 


Creatinine  1.0 mg/dL (0.7-1.3)   19  01:07    


 


Est GFR ( Amer)  > 60.0 ml/min (>90)   19  01:07    


 


Est GFR (Non-Af Amer)  > 60.0 ml/min  19  01:07    


 


BUN/Creatinine Ratio  25.0   19  01:07    


 


Glucose  99 mg/dL ()   19  01:07    


 


Calcium  9.3 mg/dL (8.6-10.3)   19  01:07    


 


Total Bilirubin  0.4 mg/dL (0.3-1.0)   19  01:07    


 


AST  15 U/L (13-39)   19  01:07    


 


ALT  20 U/L (7-52)   19  01:07    


 


Alkaline Phosphatase  90 U/L ()   19  01:07    


 


Total Protein  6.4 gm/dL (6.0-8.3)   19  01:07    


 


Albumin  3.8 gm/dL (4.2-5.5)  L  19  01:07    


 


Globulin  2.6 gm/dL  19  01:07    


 


Albumin/Globulin Ratio  1.5  (1.0-1.8)   19  01:07    


 


Triglycerides  73 mg/dL (<150)   19  01:07    


 


Cholesterol  156 mg/dL (<200)   19  01:07    


 


LDL Cholesterol Direct  101 mg/dL ()   19  01:07    


 


HDL Cholesterol  48 mg/dL (23-92)   19  01:07    


 


TSH  2.65 uIU/ml (0.34-5.60)   19  01:07    


 


Urine Source  CLEAN C   19  02:21    


 


Urine Color  YELLOW   19  02:21    


 


Urine Clarity  CLEAR  (CLEAR)   19  02:21    


 


Urine pH  5.5  (4.6 - 8.0)   19  02:21    


 


Ur Specific Gravity  1.020  (1.005-1.030)   19  02:21    


 


Urine Protein  NEGATIVE mg/dL (NEGATIVE)   19  02:21    


 


Urine Glucose (UA)  NEGATIVE mg/dL (NEGATIVE)   19  02:21    


 


Urine Ketones  NEGATIVE mg/dL (NEGATIVE)   19  02:21    


 


Urine Blood  TRACE  (NEGATIVE)   19  02:21    


 


Urine Nitrate  NEGATIVE  (NEGATIVE)   19  02:21    


 


Urine Bilirubin  NEGATIVE  (NEGATIVE)   19  02:21    


 


Urine Urobilinogen  0.2 E.U./dL (0.2 - 1.0)   19  02:21    


 


Ur Leukocyte Esterase  NEGATIVE  (NEGATIVE)   19  02:21    


 


Urine RBC  2-5 /hpf (0-5)  H  19  02:21    


 


Urine WBC  0-2 /hpf (0-5)   19  02:21    


 


Ur Epithelial Cells  NONE SEEN /lpf (FEW)   19  02:21    


 


Urine Bacteria  FEW /hpf (NONE SEEN)   19  02:21    


 


RPR  NONREACTIVE  (NONREACTIVE)   19  01:07    














- Physical Exam


Vitals and I&O: 


 Vital Signs











Temp  97.1 F   19 19:49


 


Pulse  71   19 19:49


 


Resp  20   19 19:49


 


BP  109/67   19 19:49


 


Pulse Ox  94   19 19:49








 Intake & Output











 19





 06:59 18:59 06:59


 


Intake Total 240 900 180


 


Balance 240 900 180


 


Intake:   


 


  Oral 240 900 180


 


Other:   


 


  # Voids 2 3 2


 


  # Bowel Movements  1 1











Active Medications: 


Current Medications





Acetaminophen (Tylenol)  650 mg PO Q4HR PRN


   PRN Reason: Pain or Fever >101


   Stop: 19 20:28


Al Hydrox/Mg Hydrox/Simethicone (Maalox)  30 ml PO Q4HR PRN


   PRN Reason: GI DISTRESS


   Stop: 19 02:31


   Last Admin: 19 23:24 Dose:  30 ml


Benztropine Mesylate (Cogentin)  1 mg PO DAILY [facility]


   Stop: 19 08:59


   Last Admin: 19 08:58 Dose:  1 mg


Docusate Sodium (Colace)  250 mg PO DAILY [facility]


   Stop: 19 08:59


   Last Admin: 19 08:58 Dose:  Not Given


Folic Acid (Folate)  0.5 mg PO DAILY [facility]


   Stop: 19 08:59


   Last Admin: 19 08:58 Dose:  0.5 mg


Haloperidol Decanoate (Haldol Dec)  100 mg IM QMONTH [facility]


   Stop: 10/04/19 08:59


Ipratropium Bromide (Atrovent Neb 0.5mg/2.5ml)  0.5 mg HHN Q6HRT PRN


   PRN Reason: Shortness of Breath


   Stop: 19 12:59


Lorazepam (Ativan)  0.5 mg PO Q4HR PRN; Protocol


   PRN Reason: Agitation


   Stop: 19 02:31


   Last Admin: 19 09:26 Dose:  0.5 mg


Magnesium Hydroxide (Milk Of Magnesia)  30 ml PO HS PRN


   PRN Reason: Constipation


Pantoprazole Sodium (Protonix)  40 mg PO DAILY [facility]


   Stop: 19 06:29


   Last Admin: 19 08:57 Dose:  40 mg


Quetiapine Fumarate (Seroquel)  25 mg PO DAILY [facility]; Protocol


   Stop: 19 08:59


Tamsulosin HCl (Flomax)  0.4 mg PO DAILY [facility]


   Stop: 19 08:59


   Last Admin: 19 08:57 Dose:  0.4 mg


Zolpidem Tartrate (Ambien)  5 mg PO HSMR1 PRN


   PRN Reason: Insomnia


   Stop: 19 02:31








General: demented


HEENT: NC/AT, PERRLA, EOMI, anicteric sclerae, throat clear


Neck: Supple, No JVD, No thyromegaly, +2 carotid pulse wo bruit, No LAD


Lungs: CTAB


Cardiovascular: RRR, Normal S1, Normal S2, without murmur


Abdomen: soft, non-tender, non-distended


Extremities: clear


Neurological: no change





Internal Medicine Assmt/Plan





- Assessment


Assessment: 





1.BPH


2.DJD.


3.CHRONIC CONSTIPATION.


4.PSYCHOSIS





- Plan


Plan: 





CONTINUE ON CURRENT5 MEDICATION AND DIET.





Nutritional Asmnt/Malnutr-PDOC





- Dietary Evaluation


Malnutrition Findings (Please click <Entered> for more info): 








Nutritional Asmnt/Malnutrition                             Start:  19 08:

31


Text:                                                      Status: Complete    

  


Freq:                                                                          

  


Protocol:                                                                      

  


 Document     19 08:33  TAMARDEBBY  (Rec: 19 08:47  TAMARDEBBY SALOMON-

FNS1)


 Nutritional Asmnt/Malnutrition


     Patient General Information


      Nutritional Screening                      High Risk


                                                 Consult


      Diagnosis                                  Schizophrenia, bipolar


      Pertinent Medical Hx/Surgical Hx           SCHIZOPHRENIA BIPOLAR UTI BPH


                                                 EPILEPSY ANEMIA ANOREXIA


      Subjective Information                     Patient was admitted from SNF.


                                                 Missing broken teeth. Patient


                                                 in activity room at time of


                                                 visit. Tolerating current diet


                                                 order.


      Current Diet Order/ Nutrition Support      Mechanical soft, chopped, no


                                                 added sodium, high protein


      Patient / S.O                              Not Indicated


      Pertinent Medications                      maalox, colace, folate, MOM,


                                                 Protonix


      Pertinent Labs                             () Na 134, Albumin 3.8


     Nutritional Hx/Data


      Height                                     1.7 m


      Height (Calculated Centimeters)            170.2


      Current Weight (lbs)                       59.421 kg


      Weight (Calculated Kilograms)              59.4


      Weight (Calculated Grams)                  41703.6


      Ideal Body Weight                          148


      % Ideal Body Weight                        88


      Body Mass Index (BMI)                      20.5


      Recent Weight Change                       No


      Weight Status                              Approriate


     GI Symptoms


      GI Symptoms                                None


      Last BM                                    none noted since admission


      Difficult in:                              None


      Food Allergies                             No


      Cultural/Ethnic/[place] Belief           none indicated


      Usual diet at home                         Soft ROBINSON large portion, 4oz


                                                 HPN BID


      Skin Integrity/Comment:                    Intact, Sharif 19


      Current %PO                                Good (%)


     Estimated Nutritional Goals


      BEE in Kcals:                              Adj wt of IBW


      Calories/Kcals/Kg                          IBW 67.2kg 25-30 kcal/kg


      Kcals Calculated                           ~2115-2345 kcal/day


      Protein:                                   Adj wt of IBW


      Protein g/k-1.2 gm/kg


      Protein Calculated                         ~65-80 gm/day


      Fluid: ml                                  ~4852-6517 ml/day (1 ml/kcal)


     Nutritional Problem


      2. Problem


       Problem                                   Altered nutrition related lab


                                                 values related to


       Etiology                                  electrolyte imbalance aeb


       Signs/Symptoms:                           Na 134


      1. Problem


       Problem                                   Underweight related to


       Etiology                                  possible poor intake prior to


                                                 admission aeb


       Signs/Symptoms:                           88% IBW


     Intervention/Recommendation


      Comments                                   1. Continue mechanical soft,


                                                 chopped, no added sodium, high


                                                 protein diet as tolerated by


                                                 patient.


                                                 2. Provide Ensure Enlive BID


                                                 to better meet nutrient needs


                                                 due to wt.


                                                 3. If patient remains


                                                 hyponatremic, consider fluid


                                                 restriction.


     Expected Outcomes/Goals


      Expected Outcomes/Goals                    Adequate nutrition to meet >75


                                                 % estimated needs, improved


                                                 labs,  skin remains intact,


                                                 weight maintenance or trend


                                                 toward ideal body weight.


                                                 F/U MR -

## 2019-07-23 RX ADMIN — PANTOPRAZOLE SODIUM SCH MG: 40 TABLET, DELAYED RELEASE ORAL at 08:43

## 2019-07-23 RX ADMIN — ALUMINUM HYDROXIDE, MAGNESIUM HYDROXIDE, AND SIMETHICONE PRN ML: 200; 200; 20 SUSPENSION ORAL at 22:07

## 2019-07-23 RX ADMIN — ALUMINUM HYDROXIDE, MAGNESIUM HYDROXIDE, AND SIMETHICONE PRN ML: 200; 200; 20 SUSPENSION ORAL at 18:19

## 2019-07-23 NOTE — INTERNAL MEDICINE PROG NOTE
Internal Medicine Subjective





- Subjective


Service Date: 19


Patient seen and examined:: with staff (SHE FEELS WELL)


Patient is:: awake, verbal, in bed, confused


Per staff patient has:: no adverse event





Internal Medicine Objective





- Results


Result Diagrams: 


 19 01:07





 19 01:07


Recent Labs: 


 Laboratory Last Values











WBC  7.4 Th/cmm (4.8-10.8)   19  01:07    


 


RBC  4.18 Mil/cmm (3.80-5.80)   19  01:07    


 


Hgb  12.2 gm/dL (12-16)   19  01:07    


 


Hct  36.3 % (41.0-60)  L  19  01:07    


 


MCV  87.0 fl (80-99)   19  01:07    


 


MCH  29.3 pg (27.0-31.0)   19  01:07    


 


MCHC Differential  33.7 pg (28.0-36.0)   19  01:07    


 


RDW  14.5 % (11.5-20.0)   19  01:07    


 


Plt Count  284 Th/cmm (150-400)   19  01:07    


 


MPV  7.2 fl  19  01:07    


 


Neutrophils %  45.8 % (40.0-80.0)   19  01:07    


 


Lymphocytes %  38.3 % (20.0-50.0)   19  01:07    


 


Monocytes %  11.1 % (2.0-10.0)  H  19  01:07    


 


Eosinophils %  4.0 % (0.0-5.0)   19  01:07    


 


Basophils %  0.8 % (0.0-2.0)   19  01:07    


 


Sodium  134 mEq/L (136-145)  L  19  01:07    


 


Potassium  4.1 mEq/L (3.5-5.1)   19  01:07    


 


Chloride  101 mEq/L ()   19  01:07    


 


Carbon Dioxide  25.8 mEq/L (21.0-31.0)   19  01:07    


 


Anion Gap  11.3  (7.0-16.0)   19  01:07    


 


BUN  25 mg/dL (7-25)   19  01:07    


 


Creatinine  1.0 mg/dL (0.7-1.3)   19  01:07    


 


Est GFR ( Amer)  > 60.0 ml/min (>90)   19  01:07    


 


Est GFR (Non-Af Amer)  > 60.0 ml/min  19  01:07    


 


BUN/Creatinine Ratio  25.0   19  01:07    


 


Glucose  99 mg/dL ()   19  01:07    


 


Calcium  9.3 mg/dL (8.6-10.3)   19  01:07    


 


Total Bilirubin  0.4 mg/dL (0.3-1.0)   19  01:07    


 


AST  15 U/L (13-39)   19  01:07    


 


ALT  20 U/L (7-52)   19  01:07    


 


Alkaline Phosphatase  90 U/L ()   19  01:07    


 


Total Protein  6.4 gm/dL (6.0-8.3)   19  01:07    


 


Albumin  3.8 gm/dL (4.2-5.5)  L  19  01:07    


 


Globulin  2.6 gm/dL  19  01:07    


 


Albumin/Globulin Ratio  1.5  (1.0-1.8)   19  01:07    


 


Triglycerides  73 mg/dL (<150)   19  01:07    


 


Cholesterol  156 mg/dL (<200)   19  01:07    


 


LDL Cholesterol Direct  101 mg/dL ()   19  01:07    


 


HDL Cholesterol  48 mg/dL (23-92)   19  01:07    


 


TSH  2.65 uIU/ml (0.34-5.60)   19  01:07    


 


Urine Source  CLEAN C   19  02:21    


 


Urine Color  YELLOW   19  02:21    


 


Urine Clarity  CLEAR  (CLEAR)   19  02:21    


 


Urine pH  5.5  (4.6 - 8.0)   19  02:21    


 


Ur Specific Gravity  1.020  (1.005-1.030)   19  02:21    


 


Urine Protein  NEGATIVE mg/dL (NEGATIVE)   19  02:21    


 


Urine Glucose (UA)  NEGATIVE mg/dL (NEGATIVE)   19  02:21    


 


Urine Ketones  NEGATIVE mg/dL (NEGATIVE)   19  02:21    


 


Urine Blood  TRACE  (NEGATIVE)   19  02:21    


 


Urine Nitrate  NEGATIVE  (NEGATIVE)   19  02:21    


 


Urine Bilirubin  NEGATIVE  (NEGATIVE)   19  02:21    


 


Urine Urobilinogen  0.2 E.U./dL (0.2 - 1.0)   19  02:21    


 


Ur Leukocyte Esterase  NEGATIVE  (NEGATIVE)   19  02:21    


 


Urine RBC  2-5 /hpf (0-5)  H  19  02:21    


 


Urine WBC  0-2 /hpf (0-5)   19  02:21    


 


Ur Epithelial Cells  NONE SEEN /lpf (FEW)   19  02:21    


 


Urine Bacteria  FEW /hpf (NONE SEEN)   19  02:21    


 


RPR  NONREACTIVE  (NONREACTIVE)   19  01:07    














- Physical Exam


Vitals and I&O: 


 Vital Signs











Temp  97.6 F   19 14:52


 


Pulse  84   19 19:25


 


Resp  18   19 19:25


 


BP  102/52   19 14:52


 


Pulse Ox  97   19 19:25








 Intake & Output











 19





 06:59 18:59 06:59


 


Intake Total 240  


 


Balance 240  


 


Intake:   


 


  Oral 240  


 


Other:   


 


  # Voids 2  


 


  # Bowel Movements 0  











Active Medications: 


Current Medications





Acetaminophen (Tylenol)  650 mg PO Q4HR PRN


   PRN Reason: Pain or Fever >101


   Stop: 19 20:28


Al Hydrox/Mg Hydrox/Simethicone (Maalox)  30 ml PO Q4HR PRN


   PRN Reason: GI DISTRESS


   Stop: 19 02:31


   Last Admin: 19 18:19 Dose:  30 ml


Benztropine Mesylate (Cogentin)  1 mg PO DAILY [facility]


   Stop: 19 08:59


   Last Admin: 19 08:42 Dose:  1 mg


Docusate Sodium (Colace)  250 mg PO DAILY [facility]


   Stop: 19 08:59


   Last Admin: 19 08:44 Dose:  Not Given


Folic Acid (Folate)  0.5 mg PO DAILY [facility]


   Stop: 19 08:59


   Last Admin: 19 08:43 Dose:  0.5 mg


Haloperidol Decanoate (Haldol Dec)  100 mg IM QMONTH [facility]


   Stop: 10/04/19 08:59


Ipratropium Bromide (Atrovent Neb 0.5mg/2.5ml)  0.5 mg HHN Q6HRT PRN


   PRN Reason: Shortness of Breath


   Stop: 19 12:59


Lorazepam (Ativan)  0.5 mg PO Q4HR PRN; Protocol


   PRN Reason: Agitation


   Stop: 19 02:31


   Last Admin: 19 09:26 Dose:  0.5 mg


Magnesium Hydroxide (Milk Of Magnesia)  30 ml PO HS PRN


   PRN Reason: Constipation


Pantoprazole Sodium (Protonix)  40 mg PO DAILY [facility]


   Stop: 19 06:29


   Last Admin: 19 08:43 Dose:  40 mg


Quetiapine Fumarate (Seroquel)  25 mg PO DAILY [facility]; Protocol


   Stop: 19 08:59


   Last Admin: 19 08:42 Dose:  25 mg


Tamsulosin HCl (Flomax)  0.4 mg PO DAILY [facility]


   Stop: 19 08:59


   Last Admin: 19 08:43 Dose:  0.4 mg


Zolpidem Tartrate (Ambien)  5 mg PO HSMR1 PRN


   PRN Reason: Insomnia


   Stop: 19 02:31








General: demented


HEENT: NC/AT, PERRLA, EOMI, anicteric sclerae, throat clear


Neck: Supple, No JVD, No thyromegaly, +2 carotid pulse wo bruit, No LAD


Lungs: CTAB


Cardiovascular: RRR, Normal S1, Normal S2, without murmur


Abdomen: soft, non-tender, non-distended


Extremities: clear


Neurological: no change





Internal Medicine Assmt/Plan





- Assessment


Assessment: 





1.BPH


2.DJD.


3.CHRONIC CONSTIPATION.


4.PSYCHOSIS





- Plan


Plan: 





CONTINUE ON CURRENT5 MEDICATION AND DIET.





Nutritional Asmnt/Malnutr-PDOC





- Dietary Evaluation


Malnutrition Findings (Please click <Entered> for more info): 








Nutritional Asmnt/Malnutrition                             Start:  19 08:

31


Text:                                                      Status: Complete    

  


Freq:                                                                          

  


Protocol:                                                                      

  


 Document     19 08:33  TAMARDEBBY  (Rec: 19 08:47  TAMARDEBBY SALOMON-

FNS1)


 Nutritional Asmnt/Malnutrition


     Patient General Information


      Nutritional Screening                      High Risk


                                                 Consult


      Diagnosis                                  Schizophrenia, bipolar


      Pertinent Medical Hx/Surgical Hx           SCHIZOPHRENIA BIPOLAR UTI BPH


                                                 EPILEPSY ANEMIA ANOREXIA


      Subjective Information                     Patient was admitted from SNF.


                                                 Missing broken teeth. Patient


                                                 in activity room at time of


                                                 visit. Tolerating current diet


                                                 order.


      Current Diet Order/ Nutrition Support      Mechanical soft, chopped, no


                                                 added sodium, high protein


      Patient / S.O                              Not Indicated


      Pertinent Medications                      maalox, colace, folate, MOM,


                                                 Protonix


      Pertinent Labs                             () Na 134, Albumin 3.8


     Nutritional Hx/Data


      Height                                     1.7 m


      Height (Calculated Centimeters)            170.2


      Current Weight (lbs)                       59.421 kg


      Weight (Calculated Kilograms)              59.4


      Weight (Calculated Grams)                  04813.6


      Ideal Body Weight                          148


      % Ideal Body Weight                        88


      Body Mass Index (BMI)                      20.5


      Recent Weight Change                       No


      Weight Status                              Approriate


     GI Symptoms


      GI Symptoms                                None


      Last BM                                    none noted since admission


      Difficult in:                              None


      Food Allergies                             No


      Cultural/Ethnic/M[employer]on Belief           none indicated


      Usual diet at home                         Soft ROBINSON large portion, 4oz


                                                 HPN BID


      Skin Integrity/Comment:                    Allegra, Sharif 19


      Current %PO                                Good (%)


     Estimated Nutritional Goals


      BEE in Kcals:                              Adj wt of IBW


      Calories/Kcals/Kg                          IBW 67.2kg 25-30 kcal/kg


      Kcals Calculated                           ~3307-1623 kcal/day


      Protein:                                   Adj wt of IBW


      Protein g/k-1.2 gm/kg


      Protein Calculated                         ~65-80 gm/day


      Fluid: ml                                  ~6336-5275 ml/day (1 ml/kcal)


     Nutritional Problem


      2. Problem


       Problem                                   Altered nutrition related lab


                                                 values related to


       Etiology                                  electrolyte imbalance aeb


       Signs/Symptoms:                           Na 134


      1. Problem


       Problem                                   Underweight related to


       Etiology                                  possible poor intake prior to


                                                 admission aeb


       Signs/Symptoms:                           88% IBW


     Intervention/Recommendation


      Comments                                   1. Continue mechanical soft,


                                                 chopped, no added sodium, high


                                                 protein diet as tolerated by


                                                 patient.


                                                 2. Provide Ensure Enlive BID


                                                 to better meet nutrient needs


                                                 due to wt.


                                                 3. If patient remains


                                                 hyponatremic, consider fluid


                                                 restriction.


     Expected Outcomes/Goals


      Expected Outcomes/Goals                    Adequate nutrition to meet >75


                                                 % estimated needs, improved


                                                 labs,  skin remains intact,


                                                 weight maintenance or trend


                                                 toward ideal body weight.


                                                 F/U MR -

## 2019-07-23 NOTE — PROGRESS NOTES
DATE:  07/23/2019



SUBJECTIVE:  A 69-year-old male with episodes of pacing, wandering, entering

other people's rooms, causing disturbances.  The patient has no idea why he is

in the hospital, unable to really answer any of my questions appropriately,

mostly withdrawn, keeps to self.  The patient is a male, wearing all black,

mumbling.  It is difficult for me to understand what he even says.  Ongoing

concerns about impulse control, withdrawn, quiet, disoriented, ongoing safety

concerns, concerns about his ability to really function at a lower level of

care.  Continue to monitor.  Continue low-dose Seroquel.





DD: 07/23/2019 14:54

DT: 07/23/2019 22:26

JOB# [id number]  [id number]

## 2019-07-24 RX ADMIN — PANTOPRAZOLE SODIUM SCH MG: 40 TABLET, DELAYED RELEASE ORAL at 10:53

## 2019-07-24 NOTE — PROGRESS NOTES
DATE:  



SUBJECTIVE:  Chart reviewed and the patient interviewed.  Also discussed the

patient's condition with the staff and reviewed records and labs.  The patient

still has episodes of being intrusive and angry outbursts, also is still having

episodes of agitation and anger and irritability.  He also still needs

redirections.  Otherwise, the patient seems to be in a depressed mood.  Also

seems to be calmer than before.



ASSESSMENT:  The patient is still agitated, but seems to be less and still seems

to be depressed.



TREATMENT PLAN:  Continue Seroquel and Cogentin same dose.  Also, working on his

anger and his ineffective coping.  Also, working on discharge plans.





DD: 07/24/2019 06:30

DT: 07/24/2019 07:06

JOB# [id number]  [id number]

## 2019-07-24 NOTE — INTERNAL MEDICINE PROG NOTE
Internal Medicine Subjective





- Subjective


Service Date: 19


Patient seen and examined:: without staff (HE FEELS WELL)


Patient is:: awake, verbal, in bed, confused


Per staff patient has:: no adverse event





Internal Medicine Objective





- Results


Result Diagrams: 


 19 01:07





 19 01:07


Recent Labs: 


 Laboratory Last Values











WBC  7.4 Th/cmm (4.8-10.8)   19  01:07    


 


RBC  4.18 Mil/cmm (3.80-5.80)   19  01:07    


 


Hgb  12.2 gm/dL (12-16)   19  01:07    


 


Hct  36.3 % (41.0-60)  L  19  01:07    


 


MCV  87.0 fl (80-99)   19  01:07    


 


MCH  29.3 pg (27.0-31.0)   19  01:07    


 


MCHC Differential  33.7 pg (28.0-36.0)   19  01:07    


 


RDW  14.5 % (11.5-20.0)   19  01:07    


 


Plt Count  284 Th/cmm (150-400)   19  01:07    


 


MPV  7.2 fl  19  01:07    


 


Neutrophils %  45.8 % (40.0-80.0)   19  01:07    


 


Lymphocytes %  38.3 % (20.0-50.0)   19  01:07    


 


Monocytes %  11.1 % (2.0-10.0)  H  19  01:07    


 


Eosinophils %  4.0 % (0.0-5.0)   19  01:07    


 


Basophils %  0.8 % (0.0-2.0)   19  01:07    


 


Sodium  134 mEq/L (136-145)  L  19  01:07    


 


Potassium  4.1 mEq/L (3.5-5.1)   19  01:07    


 


Chloride  101 mEq/L ()   19  01:07    


 


Carbon Dioxide  25.8 mEq/L (21.0-31.0)   19  01:07    


 


Anion Gap  11.3  (7.0-16.0)   19  01:07    


 


BUN  25 mg/dL (7-25)   19  01:07    


 


Creatinine  1.0 mg/dL (0.7-1.3)   19  01:07    


 


Est GFR ( Amer)  > 60.0 ml/min (>90)   19  01:07    


 


Est GFR (Non-Af Amer)  > 60.0 ml/min  19  01:07    


 


BUN/Creatinine Ratio  25.0   19  01:07    


 


Glucose  99 mg/dL ()   19  01:07    


 


Calcium  9.3 mg/dL (8.6-10.3)   19  01:07    


 


Total Bilirubin  0.4 mg/dL (0.3-1.0)   19  01:07    


 


AST  15 U/L (13-39)   19  01:07    


 


ALT  20 U/L (7-52)   19  01:07    


 


Alkaline Phosphatase  90 U/L ()   19  01:07    


 


Total Protein  6.4 gm/dL (6.0-8.3)   19  01:07    


 


Albumin  3.8 gm/dL (4.2-5.5)  L  19  01:07    


 


Globulin  2.6 gm/dL  19  01:07    


 


Albumin/Globulin Ratio  1.5  (1.0-1.8)   19  01:07    


 


Triglycerides  73 mg/dL (<150)   19  01:07    


 


Cholesterol  156 mg/dL (<200)   19  01:07    


 


LDL Cholesterol Direct  101 mg/dL ()   19  01:07    


 


HDL Cholesterol  48 mg/dL (23-92)   19  01:07    


 


TSH  2.65 uIU/ml (0.34-5.60)   19  01:07    


 


Urine Source  CLEAN C   19  02:21    


 


Urine Color  YELLOW   19  02:21    


 


Urine Clarity  CLEAR  (CLEAR)   19  02:21    


 


Urine pH  5.5  (4.6 - 8.0)   19  02:21    


 


Ur Specific Gravity  1.020  (1.005-1.030)   19  02:21    


 


Urine Protein  NEGATIVE mg/dL (NEGATIVE)   19  02:21    


 


Urine Glucose (UA)  NEGATIVE mg/dL (NEGATIVE)   19  02:21    


 


Urine Ketones  NEGATIVE mg/dL (NEGATIVE)   19  02:21    


 


Urine Blood  TRACE  (NEGATIVE)   19  02:21    


 


Urine Nitrate  NEGATIVE  (NEGATIVE)   19  02:21    


 


Urine Bilirubin  NEGATIVE  (NEGATIVE)   19  02:21    


 


Urine Urobilinogen  0.2 E.U./dL (0.2 - 1.0)   19  02:21    


 


Ur Leukocyte Esterase  NEGATIVE  (NEGATIVE)   19  02:21    


 


Urine RBC  2-5 /hpf (0-5)  H  19  02:21    


 


Urine WBC  0-2 /hpf (0-5)   19  02:21    


 


Ur Epithelial Cells  NONE SEEN /lpf (FEW)   19  02:21    


 


Urine Bacteria  FEW /hpf (NONE SEEN)   19  02:21    


 


RPR  NONREACTIVE  (NONREACTIVE)   19  01:07    














- Physical Exam


Vitals and I&O: 


 Vital Signs











Temp  97.5 F   19 20:46


 


Pulse  86   19 08:12


 


Resp  18   19 08:12


 


BP  103/56   19 20:46


 


Pulse Ox  96   19 08:12








 Intake & Output











 19





 18:59 06:59 18:59


 


Intake Total  240 


 


Balance  240 


 


Intake:   


 


  Oral  240 


 


Other:   


 


  # Voids  2 


 


  # Bowel Movements  0 











Active Medications: 


Current Medications





Acetaminophen (Tylenol)  650 mg PO Q4HR PRN


   PRN Reason: Pain or Fever >101


   Stop: 19 20:28


Al Hydrox/Mg Hydrox/Simethicone (Maalox)  30 ml PO Q4HR PRN


   PRN Reason: GI DISTRESS


   Stop: 19 02:31


   Last Admin: 19 22:07 Dose:  30 ml


Benztropine Mesylate (Cogentin)  1 mg PO DAILY [facility]


   Stop: 19 08:59


   Last Admin: 19 10:53 Dose:  1 mg


Docusate Sodium (Colace)  250 mg PO DAILY [facility]


   Stop: 19 08:59


   Last Admin: 19 10:53 Dose:  250 mg


Folic Acid (Folate)  0.5 mg PO DAILY [facility]


   Stop: 19 08:59


   Last Admin: 19 10:53 Dose:  0.5 mg


Haloperidol Decanoate (Haldol Dec)  100 mg IM QMONTH [facility]


   Stop: 10/04/19 08:59


Ipratropium Bromide (Atrovent Neb 0.5mg/2.5ml)  0.5 mg HHN Q6HRT PRN


   PRN Reason: Shortness of Breath


   Stop: 19 12:59


Lorazepam (Ativan)  0.5 mg PO Q4HR PRN; Protocol


   PRN Reason: Agitation


   Stop: 19 02:31


   Last Admin: 19 09:26 Dose:  0.5 mg


Magnesium Hydroxide (Milk Of Magnesia)  30 ml PO HS PRN


   PRN Reason: Constipation


Pantoprazole Sodium (Protonix)  40 mg PO DAILY [facility]


   Stop: 19 06:29


   Last Admin: 19 10:53 Dose:  40 mg


Quetiapine Fumarate (Seroquel)  25 mg PO DAILY [facility]; Protocol


   Stop: 19 08:59


   Last Admin: 19 10:53 Dose:  25 mg


Tamsulosin HCl (Flomax)  0.4 mg PO DAILY [facility]


   Stop: 19 08:59


   Last Admin: 19 10:53 Dose:  0.4 mg


Zolpidem Tartrate (Ambien)  5 mg PO HSMR1 PRN


   PRN Reason: Insomnia


   Stop: 19 02:31








General: demented


HEENT: NC/AT, PERRLA, EOMI, anicteric sclerae, throat clear


Neck: Supple, No JVD, No thyromegaly, +2 carotid pulse wo bruit, No LAD


Lungs: CTAB


Cardiovascular: RRR, Normal S1, Normal S2, without murmur


Abdomen: soft, non-tender, non-distended


Extremities: clear


Neurological: no change





Internal Medicine Assmt/Plan





- Assessment


Assessment: 





1.BPH


2.DJD.


3.CHRONIC CONSTIPATION.


4.PSYCHOSIS





- Plan


Plan: 





CONTINUE ON CURRENT5 MEDICATION AND DIET.





Nutritional Asmnt/Malnutr-PDOC





- Dietary Evaluation


Malnutrition Findings (Please click <Entered> for more info): 








Nutritional Asmnt/Malnutrition                             Start:  19 08:

31


Text:                                                      Status: Complete    

  


Freq:                                                                          

  


Protocol:                                                                      

  


 Document     19 08:33  TAMARDEBBY  (Rec: 19 08:47  TAMARDEBBY SALOMON-

FNS1)


 Nutritional Asmnt/Malnutrition


     Patient General Information


      Nutritional Screening                      High Risk


                                                 Consult


      Diagnosis                                  Schizophrenia, bipolar


      Pertinent Medical Hx/Surgical Hx           SCHIZOPHRENIA BIPOLAR UTI BPH


                                                 EPILEPSY ANEMIA ANOREXIA


      Subjective Information                     Patient was admitted from SNF.


                                                 Missing broken teeth. Patient


                                                 in activity room at time of


                                                 visit. Tolerating current diet


                                                 order.


      Current Diet Order/ Nutrition Support      Mechanical soft, chopped, no


                                                 added sodium, high protein


      Patient / S.O                              Not Indicated


      Pertinent Medications                      maalox, colace, folate, MOM,


                                                 Protonix


      Pertinent Labs                             () Na 134, Albumin 3.8


     Nutritional Hx/Data


      Height                                     1.7 m


      Height (Calculated Centimeters)            170.2


      Current Weight (lbs)                       59.421 kg


      Weight (Calculated Kilograms)              59.4


      Weight (Calculated Grams)                  80730.6


      Ideal Body Weight                          148


      % Ideal Body Weight                        88


      Body Mass Index (BMI)                      20.5


      Recent Weight Change                       No


      Weight Status                              Approriate


     GI Symptoms


      GI Symptoms                                None


      Last BM                                    none noted since admission


      Difficult in:                              None


      Food Allergies                             No


      Cultural/Ethnic/Spiritism Belief           none indicated


      Usual diet at home                         Soft ROBINSON large portion, 4oz


                                                 HPN BID


      Skin Integrity/Comment:                    Allegra, Sharif 19


      Current %PO                                Good (%)


     Estimated Nutritional Goals


      BEE in Kcals:                              Adj wt of IBW


      Calories/Kcals/Kg                          IBW 67.2kg 25-30 kcal/kg


      Kcals Calculated                           ~7918-5375 kcal/day


      Protein:                                   Adj wt of IBW


      Protein g/k-1.2 gm/kg


      Protein Calculated                         ~65-80 gm/day


      Fluid: ml                                  ~5133-1143 ml/day (1 ml/kcal)


     Nutritional Problem


      2. Problem


       Problem                                   Altered nutrition related lab


                                                 values related to


       Etiology                                  electrolyte imbalance aeb


       Signs/Symptoms:                           Na 134


      1. Problem


       Problem                                   Underweight related to


       Etiology                                  possible poor intake prior to


                                                 admission aeb


       Signs/Symptoms:                           88% IBW


     Intervention/Recommendation


      Comments                                   1. Continue mechanical soft,


                                                 chopped, no added sodium, high


                                                 protein diet as tolerated by


                                                 patient.


                                                 2. Provide Ensure Enlive BID


                                                 to better meet nutrient needs


                                                 due to wt.


                                                 3. If patient remains


                                                 hyponatremic, consider fluid


                                                 restriction.


     Expected Outcomes/Goals


      Expected Outcomes/Goals                    Adequate nutrition to meet >75


                                                 % estimated needs, improved


                                                 labs,  skin remains intact,


                                                 weight maintenance or trend


                                                 toward ideal body weight.


                                                 F/U MR -

## 2019-07-25 RX ADMIN — PANTOPRAZOLE SODIUM SCH MG: 40 TABLET, DELAYED RELEASE ORAL at 08:28

## 2019-07-25 RX ADMIN — ALUMINUM HYDROXIDE, MAGNESIUM HYDROXIDE, AND SIMETHICONE PRN ML: 200; 200; 20 SUSPENSION ORAL at 23:36

## 2019-07-25 NOTE — INTERNAL MEDICINE PROG NOTE
Internal Medicine Subjective





- Subjective


Service Date: 19


Patient seen and examined:: without staff (HE  FEELS BETTER)


Patient is:: awake, verbal, in bed, confused


Per staff patient has:: no adverse event





Internal Medicine Objective





- Results


Result Diagrams: 


 19 01:07





 19 01:07


Recent Labs: 


 Laboratory Last Values











WBC  7.4 Th/cmm (4.8-10.8)   19  01:07    


 


RBC  4.18 Mil/cmm (3.80-5.80)   19  01:07    


 


Hgb  12.2 gm/dL (12-16)   19  01:07    


 


Hct  36.3 % (41.0-60)  L  19  01:07    


 


MCV  87.0 fl (80-99)   19  01:07    


 


MCH  29.3 pg (27.0-31.0)   19  01:07    


 


MCHC Differential  33.7 pg (28.0-36.0)   19  01:07    


 


RDW  14.5 % (11.5-20.0)   19  01:07    


 


Plt Count  284 Th/cmm (150-400)   19  01:07    


 


MPV  7.2 fl  19  01:07    


 


Neutrophils %  45.8 % (40.0-80.0)   19  01:07    


 


Lymphocytes %  38.3 % (20.0-50.0)   19  01:07    


 


Monocytes %  11.1 % (2.0-10.0)  H  19  01:07    


 


Eosinophils %  4.0 % (0.0-5.0)   19  01:07    


 


Basophils %  0.8 % (0.0-2.0)   19  01:07    


 


Sodium  134 mEq/L (136-145)  L  19  01:07    


 


Potassium  4.1 mEq/L (3.5-5.1)   19  01:07    


 


Chloride  101 mEq/L ()   19  01:07    


 


Carbon Dioxide  25.8 mEq/L (21.0-31.0)   19  01:07    


 


Anion Gap  11.3  (7.0-16.0)   19  01:07    


 


BUN  25 mg/dL (7-25)   19  01:07    


 


Creatinine  1.0 mg/dL (0.7-1.3)   19  01:07    


 


Est GFR ( Amer)  > 60.0 ml/min (>90)   19  01:07    


 


Est GFR (Non-Af Amer)  > 60.0 ml/min  19  01:07    


 


BUN/Creatinine Ratio  25.0   19  01:07    


 


Glucose  99 mg/dL ()   19  01:07    


 


Calcium  9.3 mg/dL (8.6-10.3)   19  01:07    


 


Total Bilirubin  0.4 mg/dL (0.3-1.0)   19  01:07    


 


AST  15 U/L (13-39)   19  01:07    


 


ALT  20 U/L (7-52)   19  01:07    


 


Alkaline Phosphatase  90 U/L ()   19  01:07    


 


Total Protein  6.4 gm/dL (6.0-8.3)   19  01:07    


 


Albumin  3.8 gm/dL (4.2-5.5)  L  19  01:07    


 


Globulin  2.6 gm/dL  19  01:07    


 


Albumin/Globulin Ratio  1.5  (1.0-1.8)   19  01:07    


 


Triglycerides  73 mg/dL (<150)   19  01:07    


 


Cholesterol  156 mg/dL (<200)   19  01:07    


 


LDL Cholesterol Direct  101 mg/dL ()   19  01:07    


 


HDL Cholesterol  48 mg/dL (23-92)   19  01:07    


 


TSH  2.65 uIU/ml (0.34-5.60)   19  01:07    


 


Urine Source  CLEAN C   19  02:21    


 


Urine Color  YELLOW   19  02:21    


 


Urine Clarity  CLEAR  (CLEAR)   19  02:21    


 


Urine pH  5.5  (4.6 - 8.0)   19  02:21    


 


Ur Specific Gravity  1.020  (1.005-1.030)   19  02:21    


 


Urine Protein  NEGATIVE mg/dL (NEGATIVE)   19  02:21    


 


Urine Glucose (UA)  NEGATIVE mg/dL (NEGATIVE)   19  02:21    


 


Urine Ketones  NEGATIVE mg/dL (NEGATIVE)   19  02:21    


 


Urine Blood  TRACE  (NEGATIVE)   19  02:21    


 


Urine Nitrate  NEGATIVE  (NEGATIVE)   19  02:21    


 


Urine Bilirubin  NEGATIVE  (NEGATIVE)   19  02:21    


 


Urine Urobilinogen  0.2 E.U./dL (0.2 - 1.0)   19  02:21    


 


Ur Leukocyte Esterase  NEGATIVE  (NEGATIVE)   19  02:21    


 


Urine RBC  2-5 /hpf (0-5)  H  19  02:21    


 


Urine WBC  0-2 /hpf (0-5)   19  02:21    


 


Ur Epithelial Cells  NONE SEEN /lpf (FEW)   19  02:21    


 


Urine Bacteria  FEW /hpf (NONE SEEN)   19  02:21    


 


RPR  NONREACTIVE  (NONREACTIVE)   19  01:07    














- Physical Exam


Vitals and I&O: 


 Vital Signs











Temp  97.2 F   19 20:31


 


Pulse  78   19 20:31


 


Resp  20   19 20:31


 


BP  120/72   19 20:31


 


Pulse Ox  98   19 20:31








 Intake & Output











 19





 06:59 18:59 06:59


 


Intake Total 240 900 240


 


Balance 240 900 240


 


Intake:   


 


  Oral 240 900 240


 


Other:   


 


  # Voids 3 3 2


 


  # Bowel Movements 0 1 0


 


  Stool Characteristics   Formed











Active Medications: 


Current Medications





Acetaminophen (Tylenol)  650 mg PO Q4HR PRN


   PRN Reason: Pain or Fever >101


   Stop: 19 20:28


Al Hydrox/Mg Hydrox/Simethicone (Maalox)  30 ml PO Q4HR PRN


   PRN Reason: GI DISTRESS


   Stop: 19 02:31


   Last Admin: 19 22:07 Dose:  30 ml


Benztropine Mesylate (Cogentin)  1 mg PO DAILY MUKESH


   Stop: 19 08:59


   Last Admin: 07/25/19 08:27 Dose:  1 mg


Docusate Sodium (Colace)  250 mg PO DAILY MUKESH


   Stop: 19 08:59


   Last Admin: 19 08:28 Dose:  250 mg


Folic Acid (Folate)  0.5 mg PO DAILY MUKESH


   Stop: 19 08:59


   Last Admin: 19 08:28 Dose:  0.5 mg


Haloperidol Decanoate (Haldol Dec)  100 mg IM QMONTH MUKESH


   Stop: 10/04/19 08:59


Ipratropium Bromide (Atrovent Neb 0.5mg/2.5ml)  0.5 mg HHN Q6HRT PRN


   PRN Reason: Shortness of Breath


   Stop: 19 12:59


Lorazepam (Ativan)  0.5 mg PO Q4HR PRN; Protocol


   PRN Reason: Agitation


   Stop: 19 02:31


   Last Admin: 19 09:26 Dose:  0.5 mg


Magnesium Hydroxide (Milk Of Magnesia)  30 ml PO HS PRN


   PRN Reason: Constipation


Pantoprazole Sodium (Protonix)  40 mg PO DAILY MUKESH


   Stop: 19 06:29


   Last Admin: 19 08:28 Dose:  40 mg


Quetiapine Fumarate (Seroquel)  25 mg PO DAILY [facility]; Protocol


   Stop: 19 08:59


   Last Admin: 19 08:28 Dose:  25 mg


Tamsulosin HCl (Flomax)  0.4 mg PO DAILY MUKESH


   Stop: 19 08:59


   Last Admin: 19 08:29 Dose:  0.4 mg


Zolpidem Tartrate (Ambien)  5 mg PO HSMR1 PRN


   PRN Reason: Insomnia


   Stop: 19 02:31








General: demented


HEENT: NC/AT, PERRLA, EOMI, anicteric sclerae, throat clear


Neck: Supple, No JVD, No thyromegaly, +2 carotid pulse wo bruit, No LAD


Lungs: CTAB


Cardiovascular: RRR, Normal S1, Normal S2, without murmur


Abdomen: soft, non-tender, non-distended


Extremities: clear


Neurological: no change





Internal Medicine Assmt/Plan





- Assessment


Assessment: 





1.BPH


2.DJD.


3.CHRONIC CONSTIPATION.


4.PSYCHOSIS





- Plan


Plan: 





CONTINUE ON CURRENT5 MEDICATION AND DIET.





Nutritional Asmnt/Malnutr-PDOC





- Dietary Evaluation


Malnutrition Findings (Please click <Entered> for more info): 








Nutritional Asmnt/Malnutrition                             Start:  19 08:

31


Text:                                                      Status: Complete    

  


Freq:                                                                          

  


Protocol:                                                                      

  


 Document     19 08:33  DANIELSAMMIEDEBBY  (Rec: 19 08:47  MMWEN  [facility]-

FNS1)


 Nutritional Asmnt/Malnutrition


     Patient General Information


      Nutritional Screening                      High Risk


                                                 Consult


      Diagnosis                                  Schizophrenia, bipolar


      Pertinent Medical Hx/Surgical Hx           SCHIZOPHRENIA BIPOLAR UTI BPH


                                                 EPILEPSY ANEMIA ANOREXIA


      Subjective Information                     Patient [facility] from SNF.


                                                 Missing broken teeth. Patient


                                                 in activity room at time of


                                                 visit. Tolerating current diet


                                                 order.


      Current Diet Order/ Nutrition Support      Mechanical soft, chopped, no


                                                 added sodium, high protein


      Patient / S.O                              Not Indicated


      Pertinent Medications                      maalox, colace, folate, MOM,


                                                 Protonix


      Pertinent Labs                             () Na 134, Albumin 3.8


     Nutritional Hx/Data


      Height                                     1.7 m


      Height (Calculated Centimeters)            170.2


      Current Weight (lbs)                       59.421 kg


      Weight (Calculated Kilograms)              59.4


      Weight (Calculated Grams)                  23410.6


      Ideal Body Weight                          148


      % Ideal Body Weight                        88


      Body Mass Index (BMI)                      20.5


      Recent Weight Change                       No


      Weight Status                              Approriate


     GI Symptoms


      GI Symptoms                                None


      Last BM                                    none noted since admission


      Difficult in:                              None


      Food Allergies                             No


      Cultural/Ethnic/[employer]ist Belief           none indicated


      Usual diet at home                         Soft ROBINSON large portion, 4oz


                                                 HPN BID


      Skin Integrity/Comment:                    Intact, Sharif 19


      Current %PO                                Good (%)


     Estimated Nutritional Goals


      BEE in Kcals:                              Adj wt of IBW


      Calories/Kcals/Kg                          IBW 67.2kg 25-30 kcal/kg


      Kcals Calculated                           ~1282-5253 kcal/day


      Protein:                                   Adj wt of IBW


      Protein g/k-1.2 gm/kg


      Protein Calculated                         ~65-80 gm/day


      Fluid: ml                                  ~2610-9928 ml/day (1 ml/kcal)


     Nutritional Problem


      2. Problem


       Problem                                   Altered nutrition related lab


                                                 values related to


       Etiology                                  electrolyte imbalance aeb


       Signs/Symptoms:                           Na 134


      1. Problem


       Problem                                   Underweight related to


       Etiology                                  possible poor intake prior to


                                                 admission aeb


       Signs/Symptoms:                           88% IBW


     Intervention/Recommendation


      Comments                                   1. Continue mechanical soft,


                                                 chopped, no added sodium, high


                                                 protein diet as tolerated by


                                                 patient.


                                                 2. Provide Ensure Enlive BID


                                                 to better meet nutrient needs


                                                 due to wt.


                                                 3. If patient remains


                                                 hyponatremic, consider fluid


                                                 restriction.


     Expected Outcomes/Goals


      Expected Outcomes/Goals                    Adequate nutrition to meet >75


                                                 % estimated needs, improved


                                                 labs,  skin remains intact,


                                                 weight maintenance or trend


                                                 toward ideal body weight.


                                                 F/U MR -

## 2019-07-26 RX ADMIN — PANTOPRAZOLE SODIUM SCH MG: 40 TABLET, DELAYED RELEASE ORAL at 08:21

## 2019-07-26 NOTE — INTERNAL MEDICINE PROG NOTE
Internal Medicine Subjective





- Subjective


Service Date: 19


Patient seen and examined:: without staff (HE IS DOING WELL)


Patient is:: awake, verbal, in bed, confused


Per staff patient has:: no adverse event





Internal Medicine Objective





- Results


Result Diagrams: 


 19 01:07





 19 01:07


Recent Labs: 


 Laboratory Last Values











WBC  7.4 Th/cmm (4.8-10.8)   19  01:07    


 


RBC  4.18 Mil/cmm (3.80-5.80)   19  01:07    


 


Hgb  12.2 gm/dL (12-16)   19  01:07    


 


Hct  36.3 % (41.0-60)  L  19  01:07    


 


MCV  87.0 fl (80-99)   19  01:07    


 


MCH  29.3 pg (27.0-31.0)   19  01:07    


 


MCHC Differential  33.7 pg (28.0-36.0)   19  01:07    


 


RDW  14.5 % (11.5-20.0)   19  01:07    


 


Plt Count  284 Th/cmm (150-400)   19  01:07    


 


MPV  7.2 fl  19  01:07    


 


Neutrophils %  45.8 % (40.0-80.0)   19  01:07    


 


Lymphocytes %  38.3 % (20.0-50.0)   19  01:07    


 


Monocytes %  11.1 % (2.0-10.0)  H  19  01:07    


 


Eosinophils %  4.0 % (0.0-5.0)   19  01:07    


 


Basophils %  0.8 % (0.0-2.0)   19  01:07    


 


Sodium  134 mEq/L (136-145)  L  19  01:07    


 


Potassium  4.1 mEq/L (3.5-5.1)   19  01:07    


 


Chloride  101 mEq/L ()   19  01:07    


 


Carbon Dioxide  25.8 mEq/L (21.0-31.0)   19  01:07    


 


Anion Gap  11.3  (7.0-16.0)   19  01:07    


 


BUN  25 mg/dL (7-25)   19  01:07    


 


Creatinine  1.0 mg/dL (0.7-1.3)   19  01:07    


 


Est GFR ( Amer)  > 60.0 ml/min (>90)   19  01:07    


 


Est GFR (Non-Af Amer)  > 60.0 ml/min  19  01:07    


 


BUN/Creatinine Ratio  25.0   19  01:07    


 


Glucose  99 mg/dL ()   19  01:07    


 


Calcium  9.3 mg/dL (8.6-10.3)   19  01:07    


 


Total Bilirubin  0.4 mg/dL (0.3-1.0)   19  01:07    


 


AST  15 U/L (13-39)   19  01:07    


 


ALT  20 U/L (7-52)   19  01:07    


 


Alkaline Phosphatase  90 U/L ()   19  01:07    


 


Total Protein  6.4 gm/dL (6.0-8.3)   19  01:07    


 


Albumin  3.8 gm/dL (4.2-5.5)  L  19  01:07    


 


Globulin  2.6 gm/dL  19  01:07    


 


Albumin/Globulin Ratio  1.5  (1.0-1.8)   19  01:07    


 


Triglycerides  73 mg/dL (<150)   19  01:07    


 


Cholesterol  156 mg/dL (<200)   19  01:07    


 


LDL Cholesterol Direct  101 mg/dL ()   19  01:07    


 


HDL Cholesterol  48 mg/dL (23-92)   19  01:07    


 


TSH  2.65 uIU/ml (0.34-5.60)   19  01:07    


 


Urine Source  CLEAN C   19  02:21    


 


Urine Color  YELLOW   19  02:21    


 


Urine Clarity  CLEAR  (CLEAR)   19  02:21    


 


Urine pH  5.5  (4.6 - 8.0)   19  02:21    


 


Ur Specific Gravity  1.020  (1.005-1.030)   19  02:21    


 


Urine Protein  NEGATIVE mg/dL (NEGATIVE)   19  02:21    


 


Urine Glucose (UA)  NEGATIVE mg/dL (NEGATIVE)   19  02:21    


 


Urine Ketones  NEGATIVE mg/dL (NEGATIVE)   19  02:21    


 


Urine Blood  TRACE  (NEGATIVE)   19  02:21    


 


Urine Nitrate  NEGATIVE  (NEGATIVE)   19  02:21    


 


Urine Bilirubin  NEGATIVE  (NEGATIVE)   19  02:21    


 


Urine Urobilinogen  0.2 E.U./dL (0.2 - 1.0)   19  02:21    


 


Ur Leukocyte Esterase  NEGATIVE  (NEGATIVE)   19  02:21    


 


Urine RBC  2-5 /hpf (0-5)  H  19  02:21    


 


Urine WBC  0-2 /hpf (0-5)   19  02:21    


 


Ur Epithelial Cells  NONE SEEN /lpf (FEW)   19  02:21    


 


Urine Bacteria  FEW /hpf (NONE SEEN)   19  02:21    


 


RPR  NONREACTIVE  (NONREACTIVE)   19  01:07    














- Physical Exam


Vitals and I&O: 


 Vital Signs











Temp  97.1 F   19 20:00


 


Pulse  78   19 20:00


 


Resp  18   19 20:00


 


BP  96/67   19 20:00


 


Pulse Ox  95   19 20:00








 Intake & Output











 19





 06:59 18:59 06:59


 


Intake Total 360 950 960


 


Balance 360 950 960


 


Intake:   


 


  Oral 360 950 960


 


Other:   


 


  # Voids 2 3 2


 


  # Bowel Movements 0 1 0


 


  Stool Characteristics Formed Formed Formed











Active Medications: 


Current Medications





Acetaminophen (Tylenol)  650 mg PO Q4HR PRN


   PRN Reason: Pain or Fever >101


   Stop: 19 20:28


Al Hydrox/Mg Hydrox/Simethicone (Maalox)  30 ml PO Q4HR PRN


   PRN Reason: GI DISTRESS


   Stop: 19 02:31


   Last Admin: 19 23:36 Dose:  30 ml


Benztropine Mesylate (Cogentin)  1 mg PO DAILY MUKESH


   Stop: 19 08:59


   Last Admin: 19 08:21 Dose:  1 mg


Docusate Sodium (Colace)  250 mg PO DAILY MUKESH


   Stop: 19 08:59


   Last Admin: 19 08:21 Dose:  250 mg


Folic Acid (Folate)  0.5 mg PO DAILY MUKESH


   Stop: 19 08:59


   Last Admin: 19 08:21 Dose:  0.5 mg


Haloperidol Decanoate (Haldol Dec)  100 mg IM QMONTH MUKESH


   Stop: 10/04/19 08:59


Ipratropium Bromide (Atrovent Neb 0.5mg/2.5ml)  0.5 mg HHN Q6HRT PRN


   PRN Reason: Shortness of Breath


   Stop: 19 12:59


Lorazepam (Ativan)  0.5 mg PO Q4HR PRN; Protocol


   PRN Reason: Agitation


   Stop: 19 02:31


   Last Admin: 19 09:26 Dose:  0.5 mg


Magnesium Hydroxide (Milk Of Magnesia)  30 ml PO HS PRN


   PRN Reason: Constipation


Pantoprazole Sodium (Protonix)  40 mg PO DAILY MUKESH


   Stop: 19 06:29


   Last Admin: 19 08:21 Dose:  40 mg


Quetiapine Fumarate (Seroquel)  25 mg PO DAILY [facility]; Protocol


   Stop: 19 08:59


   Last Admin: 19 08:21 Dose:  25 mg


Tamsulosin HCl (Flomax)  0.4 mg PO DAILY MUKESH


   Stop: 19 08:59


   Last Admin: 19 08:21 Dose:  0.4 mg


Zolpidem Tartrate (Ambien)  5 mg PO HSMR1 PRN


   PRN Reason: Insomnia


   Stop: 19 02:31








General: demented


HEENT: NC/AT, PERRLA, EOMI, anicteric sclerae, throat clear


Neck: Supple, No JVD, No thyromegaly, +2 carotid pulse wo bruit, No LAD


Lungs: CTAB


Cardiovascular: RRR, Normal S1, Normal S2, without murmur


Abdomen: soft, non-tender, non-distended


Extremities: clear


Neurological: no change





Internal Medicine Assmt/Plan





- Assessment


Assessment: 





1.BPH


2.DJD.


3.CHRONIC CONSTIPATION.


4.PSYCHOSIS





- Plan


Plan: 





CONTINUE ON CURRENT5 MEDICATION AND DIET.





Nutritional Asmnt/Malnutr-PDOC





- Dietary Evaluation


Malnutrition Findings (Please click <Entered> for more info): 








Nutritional Asmnt/Malnutrition                             Start:  19 08:

31


Text:                                                      Status: Complete    

  


Freq:                                                                          

  


Protocol:                                                                      

  


 Document     19 08:33  MMSAMMIEJOSE  (Rec: 19 08:47  MMWEN  [facility]-

FNS1)


 Nutritional Asmnt/Malnutrition


     Patient General Information


      Nutritional Screening                      High Risk


                                                 Consult


      Diagnosis                                  Schizophrenia, bipolar


      Pertinent Medical Hx/Surgical Hx           SCHIZOPHRENIA BIPOLAR UTI BPH


                                                 EPILEPSY ANEMIA ANOREXIA


      Subjective Information                     Patient was admitted from SNF.


                                                 Missing broken teeth. Patient


                                                 in activity room at time of


                                                 visit. Tolerating current diet


                                                 order.


      Current Diet Order/ Nutrition Support      Mechanical soft, chopped, no


                                                 added sodium, high protein


      Patient / S.O                              Not Indicated


      Pertinent Medications                      maalox, colace, folate, MOM,


                                                 Protonix


      Pertinent Labs                             () Na 134, Albumin 3.8


     Nutritional Hx/Data


      Height                                     1.7 m


      Height (Calculated Centimeters)            170.2


      Current Weight (lbs)                       59.421 kg


      Weight (Calculated Kilograms)              59.4


      Weight (Calculated Grams)                  61142.6


      Ideal Body Weight                          148


      % Ideal Body Weight                        88


      Body Mass Index (BMI)                      20.5


      Recent Weight Change                       No


      Weight Status                              Approriate


     GI Symptoms


      GI Symptoms                                None


      Last BM                                    none noted since admission


      Difficult in:                              None


      Food Allergies                             No


      Cultural/Ethnic/Sikhism Belief           none indicated


      Usual diet at home                         Soft ROBINSON large portion, 4oz


                                                 HPN BID


      Skin Integrity/Comment:                    Intact, Sharif 19


      Current %PO                                Good (%)


     Estimated Nutritional Goals


      BEE in Kcals:                              Adj wt of IBW


      Calories/Kcals/Kg                          IBW 67.2kg 25-30 kcal/kg


      Kcals Calculated                           ~5794-3833 kcal/day


      Protein:                                   Adj wt of IBW


      Protein g/k-1.2 gm/kg


      Protein Calculated                         ~65-80 gm/day


      Fluid: ml                                  ~6675-4244 ml/day (1 ml/kcal)


     Nutritional Problem


      2. Problem


       Problem                                   Altered nutrition related lab


                                                 values related to


       Etiology                                  electrolyte imbalance aeb


       Signs/Symptoms:                           Na 134


      1. Problem


       Problem                                   Underweight related to


       Etiology                                  possible poor intake prior to


                                                 admission aeb


       Signs/Symptoms:                           88% IBW


     Intervention/Recommendation


      Comments                                   1. Continue mechanical soft,


                                                 chopped, no added sodium, high


                                                 protein diet as tolerated by


                                                 patient.


                                                 2. Provide Ensure Enlive BID


                                                 to better meet nutrient needs


                                                 due to wt.


                                                 3. If patient remains


                                                 hyponatremic, consider fluid


                                                 restriction.


     Expected Outcomes/Goals


      Expected Outcomes/Goals                    Adequate nutrition to meet >75


                                                 % estimated needs, improved


                                                 labs,  skin remains intact,


                                                 weight maintenance or trend


                                                 toward ideal body weight.


                                                 F/U MR -

## 2019-07-26 NOTE — PROGRESS NOTES
DATE:  07/26/2019



SUBJECTIVE:  Chart reviewed and patient interviewed.  Also discussed patient's

condition with the staff and reviewed records and labs.  The patient remains

confused and anxious.  The patient also is restless and he still needs

redirections.  The patient also is forgetful and unable to remember or retain

any information or instructions.  Otherwise, the patient is compliant with

taking his medications with no side effects of medications.



ASSESSMENT:  The patient is still confused and considered to be gravely

disabled.



TREATMENT PLAN:  Continue to monitor his behavior and his condition closely. 

Also, continue Seroquel 25 mg every day.  Also, conservatorship papers filled to

be filed for the court.





DD: 07/26/2019 05:41

DT: 07/26/2019 07:04

JOB# [id number]  [id number]

## 2019-07-27 RX ADMIN — PANTOPRAZOLE SODIUM SCH MG: 40 TABLET, DELAYED RELEASE ORAL at 08:36

## 2019-07-27 NOTE — INTERNAL MEDICINE PROG NOTE
Internal Medicine Subjective





- Subjective


Service Date: 19


Patient seen and examined:: with staff (HE IS DOING WELL)


Patient is:: awake, verbal, in bed, confused


Per staff patient has:: no adverse event





Internal Medicine Objective





- Results


Result Diagrams: 


 19 01:07





 19 01:07


Recent Labs: 


 Laboratory Last Values











WBC  7.4 Th/cmm (4.8-10.8)   19  01:07    


 


RBC  4.18 Mil/cmm (3.80-5.80)   19  01:07    


 


Hgb  12.2 gm/dL (12-16)   19  01:07    


 


Hct  36.3 % (41.0-60)  L  19  01:07    


 


MCV  87.0 fl (80-99)   19  01:07    


 


MCH  29.3 pg (27.0-31.0)   19  01:07    


 


MCHC Differential  33.7 pg (28.0-36.0)   19  01:07    


 


RDW  14.5 % (11.5-20.0)   19  01:07    


 


Plt Count  284 Th/cmm (150-400)   19  01:07    


 


MPV  7.2 fl  19  01:07    


 


Neutrophils %  45.8 % (40.0-80.0)   19  01:07    


 


Lymphocytes %  38.3 % (20.0-50.0)   19  01:07    


 


Monocytes %  11.1 % (2.0-10.0)  H  19  01:07    


 


Eosinophils %  4.0 % (0.0-5.0)   19  01:07    


 


Basophils %  0.8 % (0.0-2.0)   19  01:07    


 


Sodium  134 mEq/L (136-145)  L  19  01:07    


 


Potassium  4.1 mEq/L (3.5-5.1)   19  01:07    


 


Chloride  101 mEq/L ()   19  01:07    


 


Carbon Dioxide  25.8 mEq/L (21.0-31.0)   19  01:07    


 


Anion Gap  11.3  (7.0-16.0)   19  01:07    


 


BUN  25 mg/dL (7-25)   19  01:07    


 


Creatinine  1.0 mg/dL (0.7-1.3)   19  01:07    


 


Est GFR ( Amer)  > 60.0 ml/min (>90)   19  01:07    


 


Est GFR (Non-Af Amer)  > 60.0 ml/min  19  01:07    


 


BUN/Creatinine Ratio  25.0   19  01:07    


 


Glucose  99 mg/dL ()   19  01:07    


 


Calcium  9.3 mg/dL (8.6-10.3)   19  01:07    


 


Total Bilirubin  0.4 mg/dL (0.3-1.0)   19  01:07    


 


AST  15 U/L (13-39)   19  01:07    


 


ALT  20 U/L (7-52)   19  01:07    


 


Alkaline Phosphatase  90 U/L ()   19  01:07    


 


Total Protein  6.4 gm/dL (6.0-8.3)   19  01:07    


 


Albumin  3.8 gm/dL (4.2-5.5)  L  19  01:07    


 


Globulin  2.6 gm/dL  19  01:07    


 


Albumin/Globulin Ratio  1.5  (1.0-1.8)   19  01:07    


 


Triglycerides  73 mg/dL (<150)   19  01:07    


 


Cholesterol  156 mg/dL (<200)   19  01:07    


 


LDL Cholesterol Direct  101 mg/dL ()   19  01:07    


 


HDL Cholesterol  48 mg/dL (23-92)   19  01:07    


 


TSH  2.65 uIU/ml (0.34-5.60)   19  01:07    


 


Urine Source  CLEAN C   19  02:21    


 


Urine Color  YELLOW   19  02:21    


 


Urine Clarity  CLEAR  (CLEAR)   19  02:21    


 


Urine pH  5.5  (4.6 - 8.0)   19  02:21    


 


Ur Specific Gravity  1.020  (1.005-1.030)   19  02:21    


 


Urine Protein  NEGATIVE mg/dL (NEGATIVE)   19  02:21    


 


Urine Glucose (UA)  NEGATIVE mg/dL (NEGATIVE)   19  02:21    


 


Urine Ketones  NEGATIVE mg/dL (NEGATIVE)   19  02:21    


 


Urine Blood  TRACE  (NEGATIVE)   19  02:21    


 


Urine Nitrate  NEGATIVE  (NEGATIVE)   19  02:21    


 


Urine Bilirubin  NEGATIVE  (NEGATIVE)   19  02:21    


 


Urine Urobilinogen  0.2 E.U./dL (0.2 - 1.0)   19  02:21    


 


Ur Leukocyte Esterase  NEGATIVE  (NEGATIVE)   19  02:21    


 


Urine RBC  2-5 /hpf (0-5)  H  19  02:21    


 


Urine WBC  0-2 /hpf (0-5)   19  02:21    


 


Ur Epithelial Cells  NONE SEEN /lpf (FEW)   19  02:21    


 


Urine Bacteria  FEW /hpf (NONE SEEN)   19  02:21    


 


RPR  NONREACTIVE  (NONREACTIVE)   19  01:07    














- Physical Exam


Vitals and I&O: 


 Vital Signs











Temp  98.0 F   19 14:00


 


Pulse  76   19 14:00


 


Resp  20   19 14:00


 


BP  133/74   19 14:00


 


Pulse Ox  96   19 14:00








 Intake & Output











 19





 18:59 06:59 18:59


 


Intake Total 


 


Balance 


 


Intake:   


 


  Oral 


 


Other:   


 


  # Voids 3 3 3


 


  # Bowel Movements 1 0 0


 


  Stool Characteristics Formed Formed 











Active Medications: 


Current Medications





Acetaminophen (Tylenol)  650 mg PO Q4HR PRN


   PRN Reason: Pain or Fever >101


   Stop: 19 20:28


Al Hydrox/Mg Hydrox/Simethicone (Maalox)  30 ml PO Q4HR PRN


   PRN Reason: GI DISTRESS


   Stop: 19 02:31


   Last Admin: 19 23:36 Dose:  30 ml


Benztropine Mesylate (Cogentin)  1 mg PO DAILY MUKESH


   Stop: 19 08:59


   Last Admin: 19 08:35 Dose:  1 mg


Docusate Sodium (Colace)  250 mg PO DAILY MUKESH


   Stop: 19 08:59


   Last Admin: 19 08:35 Dose:  250 mg


Folic Acid (Folate)  0.5 mg PO DAILY MUKESH


   Stop: 19 08:59


   Last Admin: 19 08:36 Dose:  0.5 mg


Haloperidol Decanoate (Haldol Dec)  100 mg IM QMONTH MUKESH


   Stop: 10/04/19 08:59


Ipratropium Bromide (Atrovent Neb 0.5mg/2.5ml)  0.5 mg HHN Q6HRT PRN


   PRN Reason: Shortness of Breath


   Stop: 19 12:59


Lorazepam (Ativan)  0.5 mg PO Q4HR PRN; Protocol


   PRN Reason: Agitation


   Stop: 19 02:31


   Last Admin: 19 09:26 Dose:  0.5 mg


Magnesium Hydroxide (Milk Of Magnesia)  30 ml PO HS PRN


   PRN Reason: Constipation


Pantoprazole Sodium (Protonix)  40 mg PO DAILY MUKESH


   Stop: 19 06:29


   Last Admin: 19 08:36 Dose:  40 mg


Quetiapine Fumarate (Seroquel)  25 mg PO DAILY [facility]; Protocol


   Stop: 19 08:59


   Last Admin: 19 08:36 Dose:  25 mg


Tamsulosin HCl (Flomax)  0.4 mg PO DAILY MUKESH


   Stop: 19 08:59


   Last Admin: 19 08:35 Dose:  0.4 mg


Zolpidem Tartrate (Ambien)  5 mg PO HSMR1 PRN


   PRN Reason: Insomnia


   Stop: 19 02:31








General: demented


HEENT: NC/AT, PERRLA, EOMI, anicteric sclerae, throat clear


Neck: Supple, No JVD, No thyromegaly, +2 carotid pulse wo bruit, No LAD


Lungs: CTAB


Cardiovascular: RRR, Normal S1, Normal S2, without murmur


Abdomen: soft, non-tender, non-distended


Extremities: clear


Neurological: no change





Internal Medicine Assmt/Plan





- Assessment


Assessment: 





1.BPH


2.DJD.


3.CHRONIC CONSTIPATION.


4.PSYCHOSIS





- Plan


Plan: 





CONTINUE ON CURRENT5 MEDICATION AND DIET.





Nutritional Asmnt/Malnutr-PDOC





- Dietary Evaluation


Malnutrition Findings (Please click <Entered> for more info): 








Nutritional Asmnt/Malnutrition                             Start:  19 08:

31


Text:                                                      Status: Complete    

  


Freq:                                                                          

  


Protocol:                                                                      

  


 Document     19 08:33  MMSAMMIEDEBBY  (Rec: 19 08:47  MMWEN SALOMON-

FNS1)


 Nutritional Asmnt/Malnutrition


     Patient General Information


      Nutritional Screening                      High Risk


                                                 Consult


      Diagnosis                                  Schizophrenia, bipolar


      Pertinent Medical Hx/Surgical Hx           SCHIZOPHRENIA BIPOLAR UTI BPH


                                                 EPILEPSY ANEMIA ANOREXIA


      Subjective Information                     Patient was [facility] from SNF.


                                                 Missing broken teeth. Patient


                                                 in activity room at time of


                                                 visit. Tolerating current diet


                                                 order.


      Current Diet Order/ Nutrition Support      Mechanical soft, chopped, no


                                                 added sodium, high protein


      Patient / S.O                              Not Indicated


      Pertinent Medications                      maalox, colace, folate, MOM,


                                                 Protonix


      Pertinent Labs                             () Na 134, Albumin 3.8


     Nutritional Hx/Data


      Height                                     1.7 m


      Height (Calculated Centimeters)            170.2


      Current Weight (lbs)                       59.421 kg


      Weight (Calculated Kilograms)              59.4


      Weight (Calculated Grams)                  82899.6


      Ideal Body Weight                          148


      % Ideal Body Weight                        88


      Body Mass Index (BMI)                      20.5


      Recent Weight Change                       No


      Weight Status                              Approriate


     GI Symptoms


      GI Symptoms                                None


      Last BM                                    none noted since admission


      Difficult in:                              None


      Food Allergies                             No


      Cultural/Ethnic/[place] Belief           none indicated


      Usual diet at home                         Soft ROBINSON large portion, 4oz


                                                 HPN BID


      Skin Integrity/Comment:                    Intact, Sharif 19


      Current %PO                                Good (%)


     Estimated Nutritional Goals


      BEE in Kcals:                              Adj wt of IBW


      Calories/Kcals/Kg                          IBW 67.2kg 25-30 kcal/kg


      Kcals Calculated                           ~9980-5109 kcal/day


      Protein:                                   Adj wt of IBW


      Protein g/k-1.2 gm/kg


      Protein Calculated                         ~65-80 gm/day


      Fluid: ml                                  ~6212-0686 ml/day (1 ml/kcal)


     Nutritional Problem


      2. Problem


       Problem                                   Altered nutrition related lab


                                                 values related to


       Etiology                                  electrolyte imbalance aeb


       Signs/Symptoms:                           Na 134


      1. Problem


       Problem                                   Underweight related to


       Etiology                                  possible poor intake prior to


                                                 admission aeb


       Signs/Symptoms:                           88% IBW


     Intervention/Recommendation


      Comments                                   1. Continue mechanical soft,


                                                 chopped, no added sodium, high


                                                 protein diet as tolerated by


                                                 patient.


                                                 2. Provide Ensure Enlive BID


                                                 to better meet nutrient needs


                                                 due to wt.


                                                 3. If patient remains


                                                 hyponatremic, consider fluid


                                                 restriction.


     Expected Outcomes/Goals


      Expected Outcomes/Goals                    Adequate nutrition to meet >75


                                                 % estimated needs, improved


                                                 labs,  skin remains intact,


                                                 weight maintenance or trend


                                                 toward ideal body weight.


                                                 F/U MR -

## 2019-07-28 RX ADMIN — PANTOPRAZOLE SODIUM SCH MG: 40 TABLET, DELAYED RELEASE ORAL at 09:11

## 2019-07-28 NOTE — PROGRESS NOTES
DATE:  07/28/2019



Case was discussed with staff of the patient, reviewed records.  Covering for

Dr. Merino.  The patient continues to be unpredictable and impulsive, confused,

forgetful, depressed, continues to be unable to retain information.  He has been

compliant with medication with no side effects, no sedation, no nausea, no

extrapyramidal symptoms.  Staff is working also on discharge plan and will

continue outpatient group therapy, milieu therapy, and adjust medications as

needed.





DD: 07/28/2019 12:03

DT: 07/28/2019 13:57

[facility]# [id number]  [id number]

## 2019-07-28 NOTE — INTERNAL MEDICINE PROG NOTE
Internal Medicine Subjective





- Subjective


Service Date: 19


Patient seen and examined:: without staff (HE FEELS GOOD)


Patient is:: awake, verbal, in bed, confused


Per staff patient has:: no adverse event





Internal Medicine Objective





- Results


Result Diagrams: 


 19 01:07





 19 01:07


Recent Labs: 


 Laboratory Last Values











WBC  7.4 Th/cmm (4.8-10.8)   19  01:07    


 


RBC  4.18 Mil/cmm (3.80-5.80)   19  01:07    


 


Hgb  12.2 gm/dL (12-16)   19  01:07    


 


Hct  36.3 % (41.0-60)  L  19  01:07    


 


MCV  87.0 fl (80-99)   19  01:07    


 


MCH  29.3 pg (27.0-31.0)   19  01:07    


 


MCHC Differential  33.7 pg (28.0-36.0)   19  01:07    


 


RDW  14.5 % (11.5-20.0)   19  01:07    


 


Plt Count  284 Th/cmm (150-400)   19  01:07    


 


MPV  7.2 fl  19  01:07    


 


Neutrophils %  45.8 % (40.0-80.0)   19  01:07    


 


Lymphocytes %  38.3 % (20.0-50.0)   19  01:07    


 


Monocytes %  11.1 % (2.0-10.0)  H  19  01:07    


 


Eosinophils %  4.0 % (0.0-5.0)   19  01:07    


 


Basophils %  0.8 % (0.0-2.0)   19  01:07    


 


Sodium  134 mEq/L (136-145)  L  19  01:07    


 


Potassium  4.1 mEq/L (3.5-5.1)   19  01:07    


 


Chloride  101 mEq/L ()   19  01:07    


 


Carbon Dioxide  25.8 mEq/L (21.0-31.0)   19  01:07    


 


Anion Gap  11.3  (7.0-16.0)   19  01:07    


 


BUN  25 mg/dL (7-25)   19  01:07    


 


Creatinine  1.0 mg/dL (0.7-1.3)   19  01:07    


 


Est GFR ( Amer)  > 60.0 ml/min (>90)   19  01:07    


 


Est GFR (Non-Af Amer)  > 60.0 ml/min  19  01:07    


 


BUN/Creatinine Ratio  25.0   19  01:07    


 


Glucose  99 mg/dL ()   19  01:07    


 


Calcium  9.3 mg/dL (8.6-10.3)   19  01:07    


 


Total Bilirubin  0.4 mg/dL (0.3-1.0)   19  01:07    


 


AST  15 U/L (13-39)   19  01:07    


 


ALT  20 U/L (7-52)   19  01:07    


 


Alkaline Phosphatase  90 U/L ()   19  01:07    


 


Total Protein  6.4 gm/dL (6.0-8.3)   19  01:07    


 


Albumin  3.8 gm/dL (4.2-5.5)  L  19  01:07    


 


Globulin  2.6 gm/dL  19  01:07    


 


Albumin/Globulin Ratio  1.5  (1.0-1.8)   19  01:07    


 


Triglycerides  73 mg/dL (<150)   19  01:07    


 


Cholesterol  156 mg/dL (<200)   19  01:07    


 


LDL Cholesterol Direct  101 mg/dL ()   19  01:07    


 


HDL Cholesterol  48 mg/dL (23-92)   19  01:07    


 


TSH  2.65 uIU/ml (0.34-5.60)   19  01:07    


 


Urine Source  CLEAN C   19  02:21    


 


Urine Color  YELLOW   19  02:21    


 


Urine Clarity  CLEAR  (CLEAR)   19  02:21    


 


Urine pH  5.5  (4.6 - 8.0)   19  02:21    


 


Ur Specific Gravity  1.020  (1.005-1.030)   19  02:21    


 


Urine Protein  NEGATIVE mg/dL (NEGATIVE)   19  02:21    


 


Urine Glucose (UA)  NEGATIVE mg/dL (NEGATIVE)   19  02:21    


 


Urine Ketones  NEGATIVE mg/dL (NEGATIVE)   19  02:21    


 


Urine Blood  TRACE  (NEGATIVE)   19  02:21    


 


Urine Nitrate  NEGATIVE  (NEGATIVE)   19  02:21    


 


Urine Bilirubin  NEGATIVE  (NEGATIVE)   19  02:21    


 


Urine Urobilinogen  0.2 E.U./dL (0.2 - 1.0)   19  02:21    


 


Ur Leukocyte Esterase  NEGATIVE  (NEGATIVE)   19  02:21    


 


Urine RBC  2-5 /hpf (0-5)  H  19  02:21    


 


Urine WBC  0-2 /hpf (0-5)   19  02:21    


 


Ur Epithelial Cells  NONE SEEN /lpf (FEW)   19  02:21    


 


Urine Bacteria  FEW /hpf (NONE SEEN)   19  02:21    


 


RPR  NONREACTIVE  (NONREACTIVE)   19  01:07    














- Physical Exam


Vitals and I&O: 


 Vital Signs











Temp  97.5 F   19 20:10


 


Pulse  71   19 20:10


 


Resp  18   19 20:10


 


BP  132/77   19 20:10


 


Pulse Ox  99   19 20:10








 Intake & Output











 19





 06:59 18:59 06:59


 


Intake Total 600 1600 240


 


Balance 600 1600 240


 


Intake:   


 


  Oral 600 1600 240


 


Other:   


 


  # Voids 2 4 2


 


  # Bowel Movements 0 1 


 


  Stool Characteristics Formed  Formed











Active Medications: 


Current Medications





Acetaminophen (Tylenol)  650 mg PO Q4HR PRN


   PRN Reason: Pain or Fever >101


   Stop: 19 20:28


Al Hydrox/Mg Hydrox/Simethicone (Maalox)  30 ml PO Q4HR PRN


   PRN Reason: GI DISTRESS


   Stop: 19 02:31


   Last Admin: 19 23:36 Dose:  30 ml


Benztropine Mesylate (Cogentin)  1 mg PO DAILY MUKESH


   Stop: 19 08:59


   Last Admin: 07/28/19 09:12 Dose:  1 mg


Docusate Sodium (Colace)  250 mg PO DAILY MUKESH


   Stop: 19 08:59


   Last Admin: 19 09:11 Dose:  250 mg


Folic Acid (Folate)  0.5 mg PO DAILY MUKESH


   Stop: 19 08:59


   Last Admin: 19 09:11 Dose:  0.5 mg


Haloperidol Decanoate (Haldol Dec)  100 mg IM QMONTH MUKESH


   Stop: 10/04/19 08:59


Ipratropium Bromide (Atrovent Neb 0.5mg/2.5ml)  0.5 mg HHN Q6HRT PRN


   PRN Reason: Shortness of Breath


   Stop: 19 12:59


Lorazepam (Ativan)  0.5 mg PO Q4HR PRN; Protocol


   PRN Reason: Agitation


   Stop: 19 02:31


   Last Admin: 19 09:26 Dose:  0.5 mg


Magnesium Hydroxide (Milk Of Magnesia)  30 ml PO HS PRN


   PRN Reason: Constipation


Pantoprazole Sodium (Protonix)  40 mg PO DAILY MUKESH


   Stop: 19 06:29


   Last Admin: 19 09:11 Dose:  40 mg


Quetiapine Fumarate (Seroquel)  25 mg PO DAILY [facility]; Protocol


   Stop: 19 08:59


   Last Admin: 19 09:16 Dose:  25 mg


Tamsulosin HCl (Flomax)  0.4 mg PO DAILY MUKESH


   Stop: 19 08:59


   Last Admin: 19 09:10 Dose:  0.4 mg


Zolpidem Tartrate (Ambien)  5 mg PO HSMR1 PRN


   PRN Reason: Insomnia


   Stop: 19 02:31








General: demented


HEENT: NC/AT, PERRLA, EOMI, anicteric sclerae, throat clear


Neck: Supple, No JVD, No thyromegaly, +2 carotid pulse wo bruit, No LAD


Lungs: CTAB


Cardiovascular: RRR, Normal S1, Normal S2, without murmur


Abdomen: soft, non-tender, non-distended


Extremities: clear


Neurological: no change





Internal Medicine Assmt/Plan





- Assessment


Assessment: 





1.BPH


2.DJD.


3.CHRONIC CONSTIPATION.


4.PSYCHOSIS





- Plan


Plan: 





CONTINUE ON CURRENT5 MEDICATION AND DIET.





Nutritional Asmnt/Malnutr-PDOC





- Dietary Evaluation


Malnutrition Findings (Please click <Entered> for more info): 








Nutritional Asmnt/Malnutrition                             Start:  19 08:

31


Text:                                                      Status: Complete    

  


Freq:                                                                          

  


Protocol:                                                                      

  


 Document     19 08:33  DANIELSAMMIEDEBBY  (Rec: 19 08:47  MMWEN  [facility]-

FNS1)


 Nutritional Asmnt/Malnutrition


     Patient General Information


      Nutritional Screening                      High Risk


                                                 Consult


      Diagnosis                                  Schizophrenia, bipolar


      Pertinent Medical Hx/Surgical Hx           SCHIZOPHRENIA BIPOLAR UTI BPH


                                                 EPILEPSY ANEMIA ANOREXIA


      Subjective Information                     Patient was admitted from SNF.


                                                 Missing broken teeth. Patient


                                                 in activity room at time of


                                                 visit. Tolerating current diet


                                                 order.


      Current Diet Order/ Nutrition Support      Mechanical soft, chopped, no


                                                 added sodium, high protein


      Patient / S.O                              Not Indicated


      Pertinent Medications                      maalox, colace, folate, MOM,


                                                 Protonix


      Pertinent Labs                             () Na 134, Albumin 3.8


     Nutritional Hx/Data


      Height                                     1.7 m


      Height (Calculated Centimeters)            170.2


      Current Weight (lbs)                       59.421 kg


      Weight (Calculated Kilograms)              59.4


      Weight (Calculated Grams)                  87883.6


      Ideal Body Weight                          148


      % Ideal Body Weight                        88


      Body Mass Index (BMI)                      20.5


      Recent Weight Change                       No


      Weight Status                              Approriate


     GI Symptoms


      GI Symptoms                                None


      Last BM                                    none noted since admission


      Difficult in:                              None


      Food Allergies                             No


      Cultural/Ethnic/[place]on Belief           none indicated


      Usual diet at home                         Soft ROBINSON large portion, 4oz


                                                 HPN BID


      Skin Integrity/Comment:                    Intact, Sharif 19


      Current %PO                                Good (%)


     Estimated Nutritional Goals


      BEE in Kcals:                              Adj wt of IBW


      Calories/Kcals/Kg                          IBW 67.2kg 25-30 kcal/kg


      Kcals Calculated                           ~9134-0329 kcal/day


      Protein:                                   Adj wt of IBW


      Protein g/k-1.2 gm/kg


      Protein Calculated                         ~65-80 gm/day


      Fluid: ml                                  ~4635-0804 ml/day (1 ml/kcal)


     Nutritional Problem


      2. Problem


       Problem                                   Altered nutrition related lab


                                                 values related to


       Etiology                                  electrolyte imbalance aeb


       Signs/Symptoms:                           Na 134


      1. Problem


       Problem                                   Underweight related to


       Etiology                                  possible poor intake prior to


                                                 admission aeb


       Signs/Symptoms:                           88% IBW


     Intervention/Recommendation


      Comments                                   1. Continue mechanical soft,


                                                 chopped, no added sodium, high


                                                 protein diet as tolerated by


                                                 patient.


                                                 2. Provide Ensure Enlive BID


                                                 to better meet nutrient needs


                                                 due to wt.


                                                 3. If patient remains


                                                 hyponatremic, consider fluid


                                                 restriction.


     Expected Outcomes/Goals


      Expected Outcomes/Goals                    Adequate nutrition to meet >75


                                                 % estimated needs, improved


                                                 labs,  skin remains intact,


                                                 weight maintenance or trend


                                                 toward ideal body weight.


                                                 F/U MR -

## 2019-07-28 NOTE — PROGRESS NOTES
DATE:  07/27/2019



Covering for Dr. Merino.



SUBJECTIVE:  Case was discussed with staff of the patient, reviewed records. 

This is a well known case to me, I have seen him before covering for Dr. Merino. 

The patient continues to be forgetful, depressed, anxious, and confused.  The

patient is unable to retain information or instructions.  He has been compliant

with the medication with no side effects.  He continues to be gravely disabled

and unable to make safe plan for self-care.  No side effects with the

medication, no sedation, no nausea, and no extrapyramidal symptoms.  We will

continue to work with the patient in group therapy, milieu therapy, and adjust

the medications as needed.





DD: 07/27/2019 12:28

DT: 07/28/2019 00:56

JOB# [id number]  [id number]

## 2019-07-29 RX ADMIN — PANTOPRAZOLE SODIUM SCH MG: 40 TABLET, DELAYED RELEASE ORAL at 08:42

## 2019-07-30 NOTE — DISCHARGE SUMMARY
DATE OF DISCHARGE:  07/29/2019



AGE:  69.



SEX:  Male.



PHYSICIAN:  Dr. Denisa Merino



PRIMARY DIAGNOSES:  Schizoaffective disorder, bipolar type, with psychosis and

behavioral disturbances.



SECONDARY DIAGNOSES:  Dementia, moderate to severe, with behavioral

disturbances.



REASON FOR HOSPITALIZATION:  The patient was admitted to the hospital from [facility] because of increased irritability and increased

agitation and difficulty following directions.



HOSPITAL COURSE:  The patient continued to be agitated and restless and

irritable mood.  The patient also was confused and needed lots of redirections. 

The patient was given Haldol Decanoate injection for better compliance, and at

the same time, the patient was given Seroquel and the dose adjusted to 25 mg

every day.  Gradually, the patient's affect was brighter.  The patient was less

irritable and less agitated.  He also was compliant with taking his medications

with no side effects of medications.  The patient was discharged to [facility] again.



Physical examination of the patient showed no major medical problems.  Also,

blood workup was basically within normal.



AFTER-DISCHARGE PLAN:  The patient is discharged from the hospital to [facility] with plan to follow him up there.



EXPECTED OUTCOME AFTER DISCHARGE:  Fair.  Help the patient continue to take his

medications and follow up with discharge plans.





DD: 07/29/2019 20:46

DT: 07/29/2019 20:56

[facility]# [id number]  [id number]